# Patient Record
Sex: MALE | Race: BLACK OR AFRICAN AMERICAN | NOT HISPANIC OR LATINO | Employment: OTHER | URBAN - METROPOLITAN AREA
[De-identification: names, ages, dates, MRNs, and addresses within clinical notes are randomized per-mention and may not be internally consistent; named-entity substitution may affect disease eponyms.]

---

## 2023-09-19 PROBLEM — F25.9 SCHIZOAFFECTIVE DISORDER (MULTI): Status: ACTIVE | Noted: 2023-09-19

## 2023-09-19 PROBLEM — I26.09 CHRONIC PULMONARY EMBOLISM WITH ACUTE COR PULMONALE (MULTI): Status: ACTIVE | Noted: 2018-10-06

## 2023-09-19 PROBLEM — N40.1 BENIGN PROSTATIC HYPERPLASIA WITH LOWER URINARY TRACT SYMPTOMS: Status: ACTIVE | Noted: 2021-06-08

## 2023-09-19 PROBLEM — I27.82 CHRONIC PULMONARY EMBOLISM WITH ACUTE COR PULMONALE (MULTI): Status: ACTIVE | Noted: 2018-10-06

## 2023-09-19 PROBLEM — I26.99 PULMONARY EMBOLISM (MULTI): Status: ACTIVE | Noted: 2022-12-20

## 2023-09-19 PROBLEM — R07.9 CHEST PAIN: Status: ACTIVE | Noted: 2020-04-13

## 2023-09-19 PROBLEM — F32.A DEPRESSION: Status: ACTIVE | Noted: 2023-09-19

## 2023-09-19 PROBLEM — I10 HYPERTENSION: Status: ACTIVE | Noted: 2023-09-19

## 2023-09-19 PROBLEM — R45.851 SUICIDAL IDEATION: Status: ACTIVE | Noted: 2023-07-20

## 2023-09-19 PROBLEM — F41.9 ANXIETY: Status: ACTIVE | Noted: 2022-07-28

## 2023-09-19 PROBLEM — E66.9 OBESITY: Status: ACTIVE | Noted: 2022-12-20

## 2023-09-19 PROBLEM — H40.1130 PRIMARY OPEN-ANGLE GLAUCOMA, BILATERAL, STAGE UNSPECIFIED: Status: ACTIVE | Noted: 2021-06-08

## 2023-09-19 PROBLEM — G80.9 CEREBRAL PALSY (MULTI): Status: ACTIVE | Noted: 2023-09-19

## 2023-09-19 RX ORDER — PALIPERIDONE 3 MG/1
3 TABLET, EXTENDED RELEASE ORAL
COMMUNITY
Start: 2023-06-19

## 2023-09-19 RX ORDER — ONDANSETRON 4 MG/1
TABLET, ORALLY DISINTEGRATING ORAL
COMMUNITY
Start: 2023-05-25 | End: 2023-11-13 | Stop reason: WASHOUT

## 2023-09-19 RX ORDER — ISOSORBIDE MONONITRATE 30 MG/1
30 TABLET, EXTENDED RELEASE ORAL
COMMUNITY
Start: 2022-05-18

## 2023-09-19 RX ORDER — PANTOPRAZOLE SODIUM 20 MG/1
TABLET, DELAYED RELEASE ORAL
COMMUNITY
Start: 2022-12-20

## 2023-09-19 RX ORDER — AMLODIPINE BESYLATE 5 MG/1
TABLET ORAL
COMMUNITY
End: 2023-11-13 | Stop reason: WASHOUT

## 2023-09-19 RX ORDER — BENZTROPINE MESYLATE 0.5 MG/1
1 TABLET ORAL NIGHTLY
COMMUNITY
Start: 2023-06-05

## 2023-09-19 RX ORDER — TAMSULOSIN HYDROCHLORIDE 0.4 MG/1
0.4 CAPSULE ORAL DAILY
COMMUNITY
Start: 2015-04-07

## 2023-09-19 RX ORDER — METFORMIN HYDROCHLORIDE 500 MG/1
TABLET, EXTENDED RELEASE ORAL
Status: ON HOLD | COMMUNITY
End: 2023-12-25 | Stop reason: ALTCHOICE

## 2023-09-19 RX ORDER — INDAPAMIDE 1.25 MG/1
1.25 TABLET ORAL
COMMUNITY
Start: 2023-06-14

## 2023-09-19 RX ORDER — CANDESARTAN 16 MG/1
1 TABLET ORAL DAILY
COMMUNITY

## 2023-09-19 RX ORDER — BISACODYL 5 MG/1
1 TABLET, COATED ORAL
COMMUNITY

## 2023-09-19 RX ORDER — ATORVASTATIN CALCIUM 80 MG/1
80 TABLET, FILM COATED ORAL
COMMUNITY
Start: 2023-06-14 | End: 2024-06-13

## 2023-09-19 RX ORDER — TRAZODONE HYDROCHLORIDE 100 MG/1
100 TABLET ORAL
COMMUNITY
Start: 2018-01-30

## 2023-10-29 ENCOUNTER — HOSPITAL ENCOUNTER (EMERGENCY)
Facility: HOSPITAL | Age: 58
Discharge: HOME | End: 2023-10-30
Attending: EMERGENCY MEDICINE
Payer: COMMERCIAL

## 2023-10-29 VITALS
HEART RATE: 75 BPM | TEMPERATURE: 98.5 F | RESPIRATION RATE: 20 BRPM | SYSTOLIC BLOOD PRESSURE: 132 MMHG | DIASTOLIC BLOOD PRESSURE: 94 MMHG | OXYGEN SATURATION: 96 %

## 2023-10-29 DIAGNOSIS — M25.571 CHRONIC PAIN OF RIGHT ANKLE: ICD-10-CM

## 2023-10-29 DIAGNOSIS — R10.84 GENERALIZED ABDOMINAL PAIN: Primary | ICD-10-CM

## 2023-10-29 DIAGNOSIS — G89.29 CHRONIC PAIN OF RIGHT ANKLE: ICD-10-CM

## 2023-10-29 PROCEDURE — 99283 EMERGENCY DEPT VISIT LOW MDM: CPT | Mod: 25 | Performed by: EMERGENCY MEDICINE

## 2023-10-29 PROCEDURE — 99284 EMERGENCY DEPT VISIT MOD MDM: CPT | Performed by: EMERGENCY MEDICINE

## 2023-10-30 ENCOUNTER — CLINICAL SUPPORT (OUTPATIENT)
Dept: EMERGENCY MEDICINE | Facility: HOSPITAL | Age: 58
End: 2023-10-30
Payer: COMMERCIAL

## 2023-10-30 LAB
ATRIAL RATE: 53 BPM
P AXIS: 37 DEGREES
P OFFSET: 207 MS
P ONSET: 154 MS
PR INTERVAL: 148 MS
Q ONSET: 228 MS
QRS COUNT: 8 BEATS
QRS DURATION: 104 MS
QT INTERVAL: 416 MS
QTC CALCULATION(BAZETT): 390 MS
QTC FREDERICIA: 399 MS
R AXIS: -19 DEGREES
T AXIS: -37 DEGREES
T OFFSET: 436 MS
VENTRICULAR RATE: 53 BPM

## 2023-10-30 PROCEDURE — 93005 ELECTROCARDIOGRAM TRACING: CPT

## 2023-10-30 RX ORDER — ACETAMINOPHEN 325 MG/1
650 TABLET ORAL ONCE
Status: COMPLETED | OUTPATIENT
Start: 2023-10-30 | End: 2023-10-30

## 2023-10-30 RX ADMIN — ACETAMINOPHEN 650 MG: 325 TABLET ORAL at 00:23

## 2023-10-30 NOTE — ED PROVIDER NOTES
"HPI   Chief Complaint   Patient presents with    Abdominal Pain     Pt states he feels like he ate some \"bad fish\". Reports rumblings in his stomach. Pt reports nausea but mo vomiting    Generalized Body Aches     Right ankle pain after working out today       Patient is 58-year-old male with past medical history hypertension, DVT/PE on Eliquis, and schizoaffective disorder who presents to the emergency department with generalized abdominal pain.  Patient states he ate fish that he believes was not fully cooked 2 days ago.  Since he has had diffuse crampy abdominal discomfort with associated nausea but no vomiting.  He states that pain is improving, currently rating 3 out of 10 with no nausea at this time. Patient denies diarrhea, black/bloody stools, fever, chills, chest pain, or shortness of breath. Patient notes that he has been constipated over the past several days having small hard bowel movements. Patient also endorses right ankle pain that started 5 days ago after working out.  Patient was seen by his primary care provider who recommended RICE therapy at home.                        No data recorded                Patient History   Past Medical History:   Diagnosis Date    Other conditions influencing health status 07/21/2013    Glaucoma / Intraocular Pressure     Past Surgical History:   Procedure Laterality Date    CT ANGIO HEART CORONARY  10/7/2022    [place] HEART CORONARY [place] [facility] [place] [place]     No family history on file.  Social History     Tobacco Use    Smoking status: Not on file    Smokeless tobacco: Not on file   Substance Use Topics    Alcohol use: Not on file    Drug use: Not on file       Physical Exam   ED Triage Vitals [10/29/23 2350]   Temp Heart Rate Resp BP   36.9 °C (98.5 °F) 75 20 (!) 132/94      SpO2 Temp Source Heart Rate Source Patient Position   96 % Temporal -- --      BP Location FiO2 (%)     -- --       Physical Exam  Constitutional:       General: He is not in " acute distress.     Appearance: He is not ill-appearing.   HENT:      Head: Normocephalic and atraumatic.   Cardiovascular:      Rate and Rhythm: Normal rate and regular rhythm.      Heart sounds: Normal heart sounds. No murmur heard.     Comments: 2+ PT/DP pulses bilaterally.  Pulmonary:      Effort: Pulmonary effort is normal.      Breath sounds: Normal breath sounds. No wheezing, rhonchi or rales.   Abdominal:      General: Bowel sounds are normal. There is no distension.      Palpations: Abdomen is soft. There is no pulsatile mass.      Tenderness: There is no abdominal tenderness.   Musculoskeletal:      Right lower leg: No edema.      Left lower leg: No edema.      Comments:   Mild tenderness to palpation just inferior to the medial malleolus.  full active range of motion.  No pain with plantarflexion, dorsiflexion, inversion or eversion.    Skin:     General: Skin is warm and dry.   Neurological:      General: No focal deficit present.      Mental Status: He is alert and oriented to person, place, and time.       ED Course & MDM   Diagnoses as of 10/30/23 0142   Generalized abdominal pain   Chronic pain of right ankle       Medical Decision Making   58-year-old male presenting with diffuse crampy abdominal pain after eating questionable fish 2 days ago.  Pain is improving with no nausea at this time.  Patient is also concerned about his right ankle pain that he began experiencing after working out.  However, patient was seen by his primary care physician on 10/25 who performed an outpatient x-ray which revealed no acute fractures and recommended RICE therapy at home.     Given patient's history and presentation, I believe gastritis is most likely however other differential diagnoses such as peptic ulcer disease, colitis, urinary tract infection,  ureterolithiasis, and pancreatitis were considered although less likely given that patient's abdominal pain is improving and he denies associated symptoms such as  nausea, vomiting, urinary complaints, or black/bloody stools.     On exam, vital signs stable with a benign abdominal examination. Given that patient's abdominal pain is improving and he has no associated symptoms, there is no benefit from ordering basic lab work or abdominal imaging at this time. Screening EKG ordered to assess for any concerning cardiac pathology. Patient agreeable with this plan.      Patient was recently evaluated outpatient on 10/25 for his right ankle pain.   imaging of the ankle demonstrated no acute fractures and RICE therapy was recommended by primary care provider.  Given that patient's pain is improving and he is able to weight-bear, I am less concerned for an occult fracture and therefore do not think a repeat x-ray is beneficial at this time.    Patient would like Tylenol for right ankle pain at this time, 650 mg p.o. ordered.    EKG interpretation:   Rate 53.  NM interval 148.  QRS duration 104.  QT/QTc 416/390.  Sinus bradycardia.  Moderate voltage criteria for LVH, may be normal variant.  T wave abnormality, consider inferior lateral ischemia.  EKG stable compared with previous on 6/13/23.      Results discussed with patient who indicated understanding.  Patient stable for discharge home at this time.   Patient has a follow-up appointment scheduled for his right ankle pain.                  Luz Marina Davis  10/30/23 0156

## 2023-10-30 NOTE — ED PROVIDER NOTES
"HPI   Chief Complaint   Patient presents with    Abdominal Pain     Pt states he feels like he ate some \"bad fish\". Reports rumblings in his stomach. Pt reports nausea but mo vomiting    Generalized Body Aches     Right ankle pain after working out today       HPI                    No data recorded                Patient History   Past Medical History:   Diagnosis Date    Other conditions influencing health status 07/21/2013    Glaucoma / Intraocular Pressure     Past Surgical History:   Procedure Laterality Date    CT ANGIO HEART CORONARY  10/7/2022    [place]ART [place]ONARY [place] [facility] [place]     No family history on file.  Social History     Tobacco Use    Smoking status: Not on file    Smokeless tobacco: Not on file   Substance Use Topics    Alcohol use: Not on file    Drug use: Not on file       Physical Exam   ED Triage Vitals [10/29/23 2350]   Temp Heart Rate Resp BP   36.9 °C (98.5 °F) 75 20 (!) 132/94      SpO2 Temp Source Heart Rate Source Patient Position   96 % Temporal -- --      BP Location FiO2 (%)     -- --       Physical Exam    ED Course & MDM        Medical Decision Making      Procedure  Procedures  "

## 2023-11-12 ENCOUNTER — HOSPITAL ENCOUNTER (EMERGENCY)
Facility: HOSPITAL | Age: 58
Discharge: LEFT BEFORE DISCHARGE | End: 2023-11-12
Payer: COMMERCIAL

## 2023-11-12 VITALS
DIASTOLIC BLOOD PRESSURE: 76 MMHG | TEMPERATURE: 97.3 F | OXYGEN SATURATION: 97 % | SYSTOLIC BLOOD PRESSURE: 127 MMHG | RESPIRATION RATE: 20 BRPM | HEART RATE: 73 BPM

## 2023-11-12 PROCEDURE — 99281 EMR DPT VST MAYX REQ PHY/QHP: CPT

## 2023-11-12 PROCEDURE — 4500999001 HC ED NO CHARGE

## 2023-11-12 ASSESSMENT — COLUMBIA-SUICIDE SEVERITY RATING SCALE - C-SSRS
2. HAVE YOU ACTUALLY HAD ANY THOUGHTS OF KILLING YOURSELF?: NO
1. IN THE PAST MONTH, HAVE YOU WISHED YOU WERE DEAD OR WISHED YOU COULD GO TO SLEEP AND NOT WAKE UP?: NO
6. HAVE YOU EVER DONE ANYTHING, STARTED TO DO ANYTHING, OR PREPARED TO DO ANYTHING TO END YOUR LIFE?: NO

## 2023-11-13 ENCOUNTER — TELEMEDICINE (OUTPATIENT)
Dept: PRIMARY CARE | Facility: CLINIC | Age: 58
End: 2023-11-13
Payer: COMMERCIAL

## 2023-11-13 DIAGNOSIS — G89.29 CHRONIC PAIN OF RIGHT ANKLE: Primary | ICD-10-CM

## 2023-11-13 DIAGNOSIS — M25.571 CHRONIC PAIN OF RIGHT ANKLE: Primary | ICD-10-CM

## 2023-11-13 DIAGNOSIS — G80.9 CEREBRAL PALSY, UNSPECIFIED TYPE (MULTI): ICD-10-CM

## 2023-11-13 PROCEDURE — 99213 OFFICE O/P EST LOW 20 MIN: CPT | Performed by: PHYSICIAN ASSISTANT

## 2023-11-13 NOTE — PROGRESS NOTES
An interactive audio and video telecommunication system which permits real time communications between the patient (at the originating site) and provider (at the distant site) was utilized to provide this telehealth service.   Verbal consent was requested and obtained from Eleazar Burrell on this date, 11/13/23 for a telehealth visit.     Subjective    Eleazar Burrell is a 58 y.o. year old male patient presenting for virtual visit R ankle pain.   R knee and ankle pain. OA R knee physical therapy has been in physical therapy and is going to therapy for R shoulder now. R ankle pain has been ongoing. No improvement with PT, inserts and lidocaine patches. Requests physical therapy for ankle. Feels weak and pain, helps with knee, may help with ankle.    Patient Active Problem List   Diagnosis    Suicidal ideation    Schizoaffective disorder (CMS/HCC)    Primary open-angle glaucoma, bilateral, stage unspecified    Hypertension    Personal history of DVT (deep vein thrombosis)    Obesity    History of pulmonary embolism    Depression    Pulmonary embolism (CMS/HCC)    Chronic pulmonary embolism with acute cor pulmonale (CMS/HCC)    Chest pain    Cerebral palsy (CMS/HCC)    Benign prostatic hyperplasia with lower urinary tract symptoms    Anxiety       Past Medical History:   Diagnosis Date    Other conditions influencing health status 07/21/2013    Glaucoma / Intraocular Pressure      Past Surgical History:   Procedure Laterality Date    CT ANGIO CORONARY ART WITH HEARTFLOW IF SCORE >30%  10/7/2022    CT HEART CORONARY ANGIOGRAM 10/7/2022 [facility] [place] [facility]      No family history on file.   Social History     Tobacco Use    Smoking status: Not on file    Smokeless tobacco: Not on file   Substance Use Topics    Alcohol use: Not on file        Current Outpatient Medications:     apixaban (Eliquis) 5 mg tablet, Take 1 tablet (5 mg) by mouth twice a day., Disp: , Rfl:     ARIPiprazole lauroxil (Aristada) 882 mg/3.2 mL  injection, Inject 3.2 mL (882 mg) into the shoulder, thigh, or buttocks., Disp: , Rfl:     atorvastatin (Lipitor) 80 mg tablet, Take 1 tablet (80 mg) by mouth once daily., Disp: , Rfl:     benztropine (Cogentin) 0.5 mg tablet, Take 1 tablet (0.5 mg) by mouth once daily at bedtime., Disp: , Rfl:     candesartan (Atacand) 16 mg tablet, Take 1 tablet (16 mg) by mouth once daily., Disp: , Rfl:     indapamide (Lozol) 1.25 mg tablet, Take 1 tablet (1.25 mg) by mouth once daily., Disp: , Rfl:     isosorbide mononitrate ER (Imdur) 30 mg 24 hr tablet, Take 1 tablet (30 mg) by mouth once daily., Disp: , Rfl:     metFORMIN  mg 24 hr tablet, Take by mouth., Disp: , Rfl:     multivitamin with minerals iron-free (Multivitamin 50 Plus), Take 1 tablet by mouth once daily., Disp: , Rfl:     paliperidone (Invega) 3 mg 24 hr tablet, Take 1 tablet (3 mg) by mouth once daily., Disp: , Rfl:     pantoprazole (ProtoNix) 20 mg EC tablet, Take by mouth once daily., Disp: , Rfl:     tamsulosin (Flomax) 0.4 mg 24 hr capsule, Take 1 capsule (0.4 mg) by mouth once daily., Disp: , Rfl:     traZODone (Desyrel) 100 mg tablet, Take 1 tablet (100 mg) by mouth., Disp: , Rfl:      Review of Systems    Review of Systems:   Constitutional: Denies fever  HEENT: Denies ST, earache  CVS: Denies Chest pain  Pulmonary: Denies wheezing, SOB  GI: Denies N/V  : Denies dysuria  Musculoskeletal:  Denies myalgia  Neuro: Denies focal weakness or numbness.  Skin: Denies Rashes.  *Review of Systems is negative unless otherwise mentioned in HPI or ROS above.     Objective    VITALS  Pt does not have vitals available at time of visit.    Exam       Limited physical exam in virtual platform  Nontoxic. No acute distress.  Nonlabored breathing.    Assessment/Plan      Problem List Items Addressed This Visit       Cerebral palsy (CMS/HCC)    Relevant Orders    Referral to Physical Therapy    Referral to Primary Care     Other Visit Diagnoses       Chronic pain of  right ankle    -  Primary    Relevant Orders    Referral to Physical Therapy               Referral placed to primary care.      DISCHARGE      Any prescriptions were sent to the pharmacy, please make sure to pick them up and call if there are any issues or questions.     Thank you for trusting me to be a part of your healthcare.    Take care!     Katarzyna Burns PA-C  [facility]  [phone] [phone]

## 2023-11-20 ENCOUNTER — APPOINTMENT (OUTPATIENT)
Dept: ORTHOPEDIC SURGERY | Facility: HOSPITAL | Age: 58
End: 2023-11-20
Payer: MEDICAID

## 2023-11-21 ENCOUNTER — APPOINTMENT (OUTPATIENT)
Dept: PRIMARY CARE | Facility: CLINIC | Age: 58
End: 2023-11-21
Payer: COMMERCIAL

## 2023-11-21 ENCOUNTER — HOSPITAL ENCOUNTER (OUTPATIENT)
Dept: RADIOLOGY | Facility: EXTERNAL LOCATION | Age: 58
Discharge: HOME | End: 2023-11-21

## 2023-12-24 ENCOUNTER — HOSPITAL ENCOUNTER (OUTPATIENT)
Facility: HOSPITAL | Age: 58
Setting detail: OBSERVATION
LOS: 1 days | Discharge: HOME | DRG: 880 | End: 2023-12-25
Attending: EMERGENCY MEDICINE | Admitting: INTERNAL MEDICINE
Payer: COMMERCIAL

## 2023-12-24 DIAGNOSIS — F43.9 CHEST PAIN DUE TO PSYCHOLOGICAL STRESS: Primary | ICD-10-CM

## 2023-12-24 DIAGNOSIS — I10 HYPERTENSION, UNSPECIFIED TYPE: ICD-10-CM

## 2023-12-24 DIAGNOSIS — R07.9 CHEST PAIN DUE TO PSYCHOLOGICAL STRESS: Primary | ICD-10-CM

## 2023-12-24 PROCEDURE — 93010 ELECTROCARDIOGRAM REPORT: CPT | Performed by: EMERGENCY MEDICINE

## 2023-12-24 PROCEDURE — 99285 EMERGENCY DEPT VISIT HI MDM: CPT | Performed by: EMERGENCY MEDICINE

## 2023-12-24 ASSESSMENT — LIFESTYLE VARIABLES
EVER HAD A DRINK FIRST THING IN THE MORNING TO STEADY YOUR NERVES TO GET RID OF A HANGOVER: NO
HAVE PEOPLE ANNOYED YOU BY CRITICIZING YOUR DRINKING: NO
REASON UNABLE TO ASSESS: NO
EVER FELT BAD OR GUILTY ABOUT YOUR DRINKING: NO
HAVE YOU EVER FELT YOU SHOULD CUT DOWN ON YOUR DRINKING: NO

## 2023-12-24 ASSESSMENT — PAIN DESCRIPTION - LOCATION: LOCATION: CHEST

## 2023-12-24 ASSESSMENT — PAIN SCALES - GENERAL
PAINLEVEL_OUTOF10: 4
PAINLEVEL_OUTOF10: 4

## 2023-12-24 ASSESSMENT — PAIN DESCRIPTION - FREQUENCY: FREQUENCY: INTERMITTENT

## 2023-12-24 ASSESSMENT — PAIN DESCRIPTION - PAIN TYPE: TYPE: ACUTE PAIN

## 2023-12-24 ASSESSMENT — PAIN DESCRIPTION - DESCRIPTORS: DESCRIPTORS: ACHING

## 2023-12-24 ASSESSMENT — COLUMBIA-SUICIDE SEVERITY RATING SCALE - C-SSRS
6. HAVE YOU EVER DONE ANYTHING, STARTED TO DO ANYTHING, OR PREPARED TO DO ANYTHING TO END YOUR LIFE?: NO
2. HAVE YOU ACTUALLY HAD ANY THOUGHTS OF KILLING YOURSELF?: NO
1. IN THE PAST MONTH, HAVE YOU WISHED YOU WERE DEAD OR WISHED YOU COULD GO TO SLEEP AND NOT WAKE UP?: NO

## 2023-12-24 ASSESSMENT — PAIN DESCRIPTION - ORIENTATION: ORIENTATION: MID

## 2023-12-24 ASSESSMENT — PAIN - FUNCTIONAL ASSESSMENT: PAIN_FUNCTIONAL_ASSESSMENT: 0-10

## 2023-12-25 ENCOUNTER — APPOINTMENT (OUTPATIENT)
Dept: RADIOLOGY | Facility: HOSPITAL | Age: 58
DRG: 880 | End: 2023-12-25
Payer: COMMERCIAL

## 2023-12-25 VITALS
RESPIRATION RATE: 18 BRPM | WEIGHT: 192 LBS | DIASTOLIC BLOOD PRESSURE: 85 MMHG | HEART RATE: 55 BPM | SYSTOLIC BLOOD PRESSURE: 137 MMHG | BODY MASS INDEX: 29.1 KG/M2 | OXYGEN SATURATION: 99 % | HEIGHT: 68 IN | TEMPERATURE: 97.5 F

## 2023-12-25 PROBLEM — F43.9 CHEST PAIN DUE TO PSYCHOLOGICAL STRESS: Status: ACTIVE | Noted: 2023-12-25

## 2023-12-25 PROBLEM — R07.9 CHEST PAIN DUE TO PSYCHOLOGICAL STRESS: Status: ACTIVE | Noted: 2023-12-25

## 2023-12-25 LAB
ALBUMIN SERPL BCP-MCNC: 3.6 G/DL (ref 3.4–5)
ALP SERPL-CCNC: 58 U/L (ref 33–136)
ALT SERPL W P-5'-P-CCNC: 12 U/L (ref 10–52)
ANION GAP SERPL CALC-SCNC: 11 MMOL/L (ref 10–20)
AST SERPL W P-5'-P-CCNC: 19 U/L (ref 9–39)
ATRIAL RATE: 51 BPM
BASOPHILS # BLD AUTO: 0.02 X10*3/UL (ref 0–0.1)
BASOPHILS NFR BLD AUTO: 0.3 %
BILIRUB SERPL-MCNC: 0.5 MG/DL (ref 0–1.2)
BUN SERPL-MCNC: 23 MG/DL (ref 6–23)
CALCIUM SERPL-MCNC: 9.5 MG/DL (ref 8.6–10.6)
CARDIAC TROPONIN I PNL SERPL HS: 76 NG/L (ref 0–53)
CARDIAC TROPONIN I PNL SERPL HS: 80 NG/L (ref 0–53)
CARDIAC TROPONIN I PNL SERPL HS: 83 NG/L (ref 0–53)
CHLORIDE SERPL-SCNC: 105 MMOL/L (ref 98–107)
CO2 SERPL-SCNC: 28 MMOL/L (ref 21–32)
CREAT SERPL-MCNC: 1.08 MG/DL (ref 0.5–1.3)
EOSINOPHIL # BLD AUTO: 0.08 X10*3/UL (ref 0–0.7)
EOSINOPHIL NFR BLD AUTO: 1.3 %
ERYTHROCYTE [DISTWIDTH] IN BLOOD BY AUTOMATED COUNT: 12.9 % (ref 11.5–14.5)
GFR SERPL CREATININE-BSD FRML MDRD: 79 ML/MIN/1.73M*2
GLUCOSE SERPL-MCNC: 98 MG/DL (ref 74–99)
HCT VFR BLD AUTO: 38.7 % (ref 41–52)
HGB BLD-MCNC: 13.5 G/DL (ref 13.5–17.5)
IMM GRANULOCYTES # BLD AUTO: 0.01 X10*3/UL (ref 0–0.7)
IMM GRANULOCYTES NFR BLD AUTO: 0.2 % (ref 0–0.9)
LYMPHOCYTES # BLD AUTO: 3.2 X10*3/UL (ref 1.2–4.8)
LYMPHOCYTES NFR BLD AUTO: 51.6 %
MAGNESIUM SERPL-MCNC: 1.92 MG/DL (ref 1.6–2.4)
MCH RBC QN AUTO: 30.1 PG (ref 26–34)
MCHC RBC AUTO-ENTMCNC: 34.9 G/DL (ref 32–36)
MCV RBC AUTO: 86 FL (ref 80–100)
MONOCYTES # BLD AUTO: 0.65 X10*3/UL (ref 0.1–1)
MONOCYTES NFR BLD AUTO: 10.5 %
NEUTROPHILS # BLD AUTO: 2.24 X10*3/UL (ref 1.2–7.7)
NEUTROPHILS NFR BLD AUTO: 36.1 %
NRBC BLD-RTO: 0 /100 WBCS (ref 0–0)
P AXIS: 31 DEGREES
P OFFSET: 184 MS
P ONSET: 133 MS
PLATELET # BLD AUTO: 183 X10*3/UL (ref 150–450)
POTASSIUM SERPL-SCNC: 3.4 MMOL/L (ref 3.5–5.3)
PR INTERVAL: 156 MS
PROT SERPL-MCNC: 6.5 G/DL (ref 6.4–8.2)
Q ONSET: 211 MS
QRS COUNT: 8 BEATS
QRS DURATION: 110 MS
QT INTERVAL: 416 MS
QTC CALCULATION(BAZETT): 383 MS
QTC FREDERICIA: 394 MS
R AXIS: -25 DEGREES
RBC # BLD AUTO: 4.48 X10*6/UL (ref 4.5–5.9)
SODIUM SERPL-SCNC: 141 MMOL/L (ref 136–145)
T AXIS: -29 DEGREES
T OFFSET: 419 MS
VENTRICULAR RATE: 51 BPM
WBC # BLD AUTO: 6.2 X10*3/UL (ref 4.4–11.3)

## 2023-12-25 PROCEDURE — 2500000001 HC RX 250 WO HCPCS SELF ADMINISTERED DRUGS (ALT 637 FOR MEDICARE OP)

## 2023-12-25 PROCEDURE — 99239 HOSP IP/OBS DSCHRG MGMT >30: CPT

## 2023-12-25 PROCEDURE — 36415 COLL VENOUS BLD VENIPUNCTURE: CPT

## 2023-12-25 PROCEDURE — 84484 ASSAY OF TROPONIN QUANT: CPT

## 2023-12-25 PROCEDURE — 71045 X-RAY EXAM CHEST 1 VIEW: CPT

## 2023-12-25 PROCEDURE — 71045 X-RAY EXAM CHEST 1 VIEW: CPT | Performed by: RADIOLOGY

## 2023-12-25 PROCEDURE — 85025 COMPLETE CBC W/AUTO DIFF WBC: CPT

## 2023-12-25 PROCEDURE — 1100000001 HC PRIVATE ROOM DAILY

## 2023-12-25 PROCEDURE — 80053 COMPREHEN METABOLIC PANEL: CPT

## 2023-12-25 PROCEDURE — 36415 COLL VENOUS BLD VENIPUNCTURE: CPT | Mod: 59

## 2023-12-25 PROCEDURE — G0378 HOSPITAL OBSERVATION PER HR: HCPCS

## 2023-12-25 PROCEDURE — 2500000004 HC RX 250 GENERAL PHARMACY W/ HCPCS (ALT 636 FOR OP/ED)

## 2023-12-25 PROCEDURE — 83735 ASSAY OF MAGNESIUM: CPT

## 2023-12-25 RX ORDER — POLYETHYLENE GLYCOL 3350 17 G/17G
17 POWDER, FOR SOLUTION ORAL ONCE AS NEEDED
COMMUNITY

## 2023-12-25 RX ORDER — TAMSULOSIN HYDROCHLORIDE 0.4 MG/1
0.4 CAPSULE ORAL DAILY
Status: DISCONTINUED | OUTPATIENT
Start: 2023-12-25 | End: 2023-12-25 | Stop reason: HOSPADM

## 2023-12-25 RX ORDER — HYDROXYZINE HYDROCHLORIDE 10 MG/1
10 TABLET, FILM COATED ORAL NIGHTLY PRN
Status: DISCONTINUED | OUTPATIENT
Start: 2023-12-25 | End: 2023-12-25 | Stop reason: HOSPADM

## 2023-12-25 RX ORDER — CANDESARTAN 16 MG/1
16 TABLET ORAL DAILY
Qty: 30 TABLET | Refills: 1 | Status: SHIPPED | OUTPATIENT
Start: 2023-12-25 | End: 2024-02-23

## 2023-12-25 RX ORDER — ACETAMINOPHEN 500 MG
1000 TABLET ORAL 2 TIMES DAILY PRN
COMMUNITY

## 2023-12-25 RX ORDER — POTASSIUM CHLORIDE 1.5 G/1.58G
20 POWDER, FOR SOLUTION ORAL 2 TIMES DAILY
Status: DISCONTINUED | OUTPATIENT
Start: 2023-12-25 | End: 2023-12-25 | Stop reason: HOSPADM

## 2023-12-25 RX ORDER — DIVALPROEX SODIUM 500 MG/1
1000 TABLET, FILM COATED, EXTENDED RELEASE ORAL NIGHTLY
COMMUNITY

## 2023-12-25 RX ORDER — RISPERIDONE 0.5 MG/1
0.5 TABLET ORAL 2 TIMES DAILY
Status: DISCONTINUED | OUTPATIENT
Start: 2023-12-25 | End: 2023-12-25 | Stop reason: HOSPADM

## 2023-12-25 RX ORDER — ISOSORBIDE MONONITRATE 30 MG/1
30 TABLET, EXTENDED RELEASE ORAL DAILY
Status: DISCONTINUED | OUTPATIENT
Start: 2023-12-25 | End: 2023-12-25 | Stop reason: HOSPADM

## 2023-12-25 RX ORDER — NITROGLYCERIN 0.4 MG/1
0.4 TABLET SUBLINGUAL EVERY 5 MIN PRN
Status: DISCONTINUED | OUTPATIENT
Start: 2023-12-25 | End: 2023-12-25

## 2023-12-25 RX ORDER — DICLOFENAC SODIUM 10 MG/G
4 GEL TOPICAL ONCE AS NEEDED
COMMUNITY

## 2023-12-25 RX ORDER — ATORVASTATIN CALCIUM 80 MG/1
80 TABLET, FILM COATED ORAL
Status: DISCONTINUED | OUTPATIENT
Start: 2023-12-25 | End: 2023-12-25 | Stop reason: HOSPADM

## 2023-12-25 RX ORDER — TRAZODONE HYDROCHLORIDE 50 MG/1
100 TABLET ORAL NIGHTLY
Status: DISCONTINUED | OUTPATIENT
Start: 2023-12-25 | End: 2023-12-25 | Stop reason: HOSPADM

## 2023-12-25 RX ORDER — PALIPERIDONE 3 MG/1
3 TABLET, EXTENDED RELEASE ORAL
Status: DISCONTINUED | OUTPATIENT
Start: 2023-12-25 | End: 2023-12-25

## 2023-12-25 RX ORDER — POLYETHYLENE GLYCOL 3350 17 G/17G
17 POWDER, FOR SOLUTION ORAL DAILY
Status: DISCONTINUED | OUTPATIENT
Start: 2023-12-25 | End: 2023-12-25 | Stop reason: HOSPADM

## 2023-12-25 RX ORDER — TALC
3 POWDER (GRAM) TOPICAL NIGHTLY PRN
COMMUNITY

## 2023-12-25 RX ORDER — PANTOPRAZOLE SODIUM 40 MG/1
40 TABLET, DELAYED RELEASE ORAL
Status: DISCONTINUED | OUTPATIENT
Start: 2023-12-25 | End: 2023-12-25 | Stop reason: HOSPADM

## 2023-12-25 RX ORDER — BENZTROPINE MESYLATE 0.5 MG/1
0.5 TABLET ORAL NIGHTLY
Status: DISCONTINUED | OUTPATIENT
Start: 2023-12-25 | End: 2023-12-25 | Stop reason: HOSPADM

## 2023-12-25 RX ORDER — AMLODIPINE BESYLATE 5 MG/1
5 TABLET ORAL DAILY
COMMUNITY

## 2023-12-25 RX ORDER — NITROGLYCERIN 0.4 MG/1
0.4 TABLET SUBLINGUAL EVERY 5 MIN PRN
COMMUNITY

## 2023-12-25 RX ADMIN — ISOSORBIDE MONONITRATE 30 MG: 30 TABLET, EXTENDED RELEASE ORAL at 11:55

## 2023-12-25 RX ADMIN — RISPERIDONE 0.5 MG: 0.5 TABLET ORAL at 11:55

## 2023-12-25 RX ADMIN — TAMSULOSIN HYDROCHLORIDE 0.4 MG: 0.4 CAPSULE ORAL at 10:15

## 2023-12-25 RX ADMIN — ATORVASTATIN CALCIUM 80 MG: 20 TABLET, FILM COATED ORAL at 07:16

## 2023-12-25 RX ADMIN — APIXABAN 5 MG: 5 TABLET, FILM COATED ORAL at 10:15

## 2023-12-25 RX ADMIN — POTASSIUM CHLORIDE 20 MEQ: 1.5 POWDER, FOR SOLUTION ORAL at 10:15

## 2023-12-25 RX ADMIN — PANTOPRAZOLE SODIUM 40 MG: 40 TABLET, DELAYED RELEASE ORAL at 07:16

## 2023-12-25 SDOH — SOCIAL STABILITY: SOCIAL NETWORK: HOW OFTEN DO YOU ATTEND CHURCH OR RELIGIOUS SERVICES?: MORE THAN 4 TIMES PER YEAR

## 2023-12-25 SDOH — HEALTH STABILITY: MENTAL HEALTH: HOW OFTEN DO YOU HAVE A DRINK CONTAINING ALCOHOL?: NEVER

## 2023-12-25 SDOH — HEALTH STABILITY: PHYSICAL HEALTH: ON AVERAGE, HOW MANY MINUTES DO YOU ENGAGE IN EXERCISE AT THIS LEVEL?: PATIENT DECLINED

## 2023-12-25 SDOH — SOCIAL STABILITY: SOCIAL NETWORK
DO YOU BELONG TO ANY CLUBS OR ORGANIZATIONS SUCH AS CHURCH GROUPS UNIONS, FRATERNAL OR ATHLETIC GROUPS, OR SCHOOL GROUPS?: YES

## 2023-12-25 SDOH — SOCIAL STABILITY: SOCIAL NETWORK: HOW OFTEN DO YOU GET TOGETHER WITH FRIENDS OR RELATIVES?: PATIENT DECLINED

## 2023-12-25 SDOH — ECONOMIC STABILITY: INCOME INSECURITY: HOW HARD IS IT FOR YOU TO PAY FOR THE VERY BASICS LIKE FOOD, HOUSING, MEDICAL CARE, AND HEATING?: NOT VERY HARD

## 2023-12-25 SDOH — SOCIAL STABILITY: SOCIAL INSECURITY: HAS ANYONE EVER THREATENED TO HURT YOUR FAMILY OR YOUR PETS?: NO

## 2023-12-25 SDOH — SOCIAL STABILITY: SOCIAL NETWORK: HOW OFTEN DO YOU ATTENT MEETINGS OF THE CLUB OR ORGANIZATION YOU BELONG TO?: MORE THAN 4 TIMES PER YEAR

## 2023-12-25 SDOH — ECONOMIC STABILITY: FOOD INSECURITY: WITHIN THE PAST 12 MONTHS, YOU WORRIED THAT YOUR FOOD WOULD RUN OUT BEFORE YOU GOT MONEY TO BUY MORE.: NEVER TRUE

## 2023-12-25 SDOH — HEALTH STABILITY: MENTAL HEALTH
STRESS IS WHEN SOMEONE FEELS TENSE, NERVOUS, ANXIOUS, OR CAN'T SLEEP AT NIGHT BECAUSE THEIR MIND IS TROUBLED. HOW STRESSED ARE YOU?: VERY MUCH

## 2023-12-25 SDOH — ECONOMIC STABILITY: INCOME INSECURITY: IN THE LAST 12 MONTHS, WAS THERE A TIME WHEN YOU WERE NOT ABLE TO PAY THE MORTGAGE OR RENT ON TIME?: NO

## 2023-12-25 SDOH — SOCIAL STABILITY: SOCIAL INSECURITY: WITHIN THE LAST YEAR, HAVE YOU BEEN HUMILIATED OR EMOTIONALLY ABUSED IN OTHER WAYS BY YOUR PARTNER OR EX-PARTNER?: NO

## 2023-12-25 SDOH — SOCIAL STABILITY: SOCIAL NETWORK: IN A TYPICAL WEEK, HOW MANY TIMES DO YOU TALK ON THE PHONE WITH FAMILY, FRIENDS, OR NEIGHBORS?: THREE TIMES A WEEK

## 2023-12-25 SDOH — ECONOMIC STABILITY: HOUSING INSECURITY
IN THE LAST 12 MONTHS, WAS THERE A TIME WHEN YOU DID NOT HAVE A STEADY PLACE TO SLEEP OR SLEPT IN A SHELTER (INCLUDING NOW)?: NO

## 2023-12-25 SDOH — SOCIAL STABILITY: SOCIAL INSECURITY
WITHIN THE LAST YEAR, HAVE YOU BEEN KICKED, HIT, SLAPPED, OR OTHERWISE PHYSICALLY HURT BY YOUR PARTNER OR EX-PARTNER?: NO

## 2023-12-25 SDOH — ECONOMIC STABILITY: TRANSPORTATION INSECURITY
IN THE PAST 12 MONTHS, HAS LACK OF TRANSPORTATION KEPT YOU FROM MEETINGS, WORK, OR FROM GETTING THINGS NEEDED FOR DAILY LIVING?: NO

## 2023-12-25 SDOH — SOCIAL STABILITY: SOCIAL NETWORK: ARE YOU MARRIED, WIDOWED, DIVORCED, SEPARATED, NEVER MARRIED, OR LIVING WITH A PARTNER?: PATIENT DECLINED

## 2023-12-25 SDOH — SOCIAL STABILITY: SOCIAL INSECURITY: WITHIN THE LAST YEAR, HAVE YOU BEEN AFRAID OF YOUR PARTNER OR EX-PARTNER?: NO

## 2023-12-25 SDOH — SOCIAL STABILITY: SOCIAL INSECURITY: ARE YOU OR HAVE YOU BEEN THREATENED OR ABUSED PHYSICALLY, EMOTIONALLY, OR SEXUALLY BY ANYONE?: NO

## 2023-12-25 SDOH — SOCIAL STABILITY: SOCIAL INSECURITY
WITHIN THE LAST YEAR, HAVE TO BEEN RAPED OR FORCED TO HAVE ANY KIND OF SEXUAL ACTIVITY BY YOUR PARTNER OR EX-PARTNER?: NO

## 2023-12-25 SDOH — SOCIAL STABILITY: SOCIAL INSECURITY: ABUSE: ADULT

## 2023-12-25 SDOH — ECONOMIC STABILITY: HOUSING INSECURITY: IN THE LAST 12 MONTHS, HOW MANY PLACES HAVE YOU LIVED?: 1

## 2023-12-25 SDOH — HEALTH STABILITY: MENTAL HEALTH: HOW MANY STANDARD DRINKS CONTAINING ALCOHOL DO YOU HAVE ON A TYPICAL DAY?: PATIENT DOES NOT DRINK

## 2023-12-25 SDOH — ECONOMIC STABILITY: INCOME INSECURITY: IN THE PAST 12 MONTHS, HAS THE ELECTRIC, GAS, OIL, OR WATER COMPANY THREATENED TO SHUT OFF SERVICE IN YOUR HOME?: NO

## 2023-12-25 SDOH — SOCIAL STABILITY: SOCIAL INSECURITY: DOES ANYONE TRY TO KEEP YOU FROM HAVING/CONTACTING OTHER FRIENDS OR DOING THINGS OUTSIDE YOUR HOME?: NO

## 2023-12-25 SDOH — ECONOMIC STABILITY: FOOD INSECURITY: WITHIN THE PAST 12 MONTHS, THE FOOD YOU BOUGHT JUST DIDN'T LAST AND YOU DIDN'T HAVE MONEY TO GET MORE.: NEVER TRUE

## 2023-12-25 SDOH — ECONOMIC STABILITY: TRANSPORTATION INSECURITY
IN THE PAST 12 MONTHS, HAS THE LACK OF TRANSPORTATION KEPT YOU FROM MEDICAL APPOINTMENTS OR FROM GETTING MEDICATIONS?: NO

## 2023-12-25 SDOH — HEALTH STABILITY: MENTAL HEALTH: HOW OFTEN DO YOU HAVE 6 OR MORE DRINKS ON ONE OCCASION?: NEVER

## 2023-12-25 SDOH — HEALTH STABILITY: PHYSICAL HEALTH
ON AVERAGE, HOW MANY DAYS PER WEEK DO YOU ENGAGE IN MODERATE TO STRENUOUS EXERCISE (LIKE A BRISK WALK)?: PATIENT DECLINED

## 2023-12-25 SDOH — SOCIAL STABILITY: SOCIAL INSECURITY: WERE YOU ABLE TO COMPLETE ALL THE BEHAVIORAL HEALTH SCREENINGS?: YES

## 2023-12-25 SDOH — SOCIAL STABILITY: SOCIAL INSECURITY: DO YOU FEEL UNSAFE GOING BACK TO THE PLACE WHERE YOU ARE LIVING?: NO

## 2023-12-25 SDOH — SOCIAL STABILITY: SOCIAL INSECURITY: HAVE YOU HAD THOUGHTS OF HARMING ANYONE ELSE?: NO

## 2023-12-25 SDOH — SOCIAL STABILITY: SOCIAL INSECURITY: DO YOU FEEL ANYONE HAS EXPLOITED OR TAKEN ADVANTAGE OF YOU FINANCIALLY OR OF YOUR PERSONAL PROPERTY?: NO

## 2023-12-25 SDOH — SOCIAL STABILITY: SOCIAL INSECURITY: ARE THERE ANY APPARENT SIGNS OF INJURIES/BEHAVIORS THAT COULD BE RELATED TO ABUSE/NEGLECT?: NO

## 2023-12-25 ASSESSMENT — PAIN DESCRIPTION - ORIENTATION: ORIENTATION: MID

## 2023-12-25 ASSESSMENT — PATIENT HEALTH QUESTIONNAIRE - PHQ9
1. LITTLE INTEREST OR PLEASURE IN DOING THINGS: MORE THAN HALF THE DAYS
2. FEELING DOWN, DEPRESSED OR HOPELESS: NEARLY EVERY DAY
SUM OF ALL RESPONSES TO PHQ9 QUESTIONS 1 & 2: 5

## 2023-12-25 ASSESSMENT — PAIN DESCRIPTION - LOCATION: LOCATION: CHEST

## 2023-12-25 ASSESSMENT — LIFESTYLE VARIABLES
AUDIT-C TOTAL SCORE: 0
HOW MANY STANDARD DRINKS CONTAINING ALCOHOL DO YOU HAVE ON A TYPICAL DAY: PATIENT DOES NOT DRINK
PRESCIPTION_ABUSE_PAST_12_MONTHS: NO
SKIP TO QUESTIONS 9-10: 1
AUDIT-C TOTAL SCORE: 0
SUBSTANCE_ABUSE_PAST_12_MONTHS: NO
AUDIT-C TOTAL SCORE: 0
AUDIT-C TOTAL SCORE: 0
HOW OFTEN DO YOU HAVE 6 OR MORE DRINKS ON ONE OCCASION: NEVER
AUDIT-C TOTAL SCORE: 0
SKIP TO QUESTIONS 9-10: 1
HOW OFTEN DO YOU HAVE A DRINK CONTAINING ALCOHOL: NEVER
SKIP TO QUESTIONS 9-10: 1
SKIP TO QUESTIONS 9-10: 1

## 2023-12-25 ASSESSMENT — ACTIVITIES OF DAILY LIVING (ADL)
TOILETING: INDEPENDENT
HEARING - RIGHT EAR: FUNCTIONAL
JUDGMENT_ADEQUATE_SAFELY_COMPLETE_DAILY_ACTIVITIES: YES
ASSISTIVE_DEVICE: NONE;OTHER (COMMENT)
DRESSING YOURSELF: INDEPENDENT
GROOMING: INDEPENDENT
ADEQUATE_TO_COMPLETE_ADL: YES
PATIENT'S MEMORY ADEQUATE TO SAFELY COMPLETE DAILY ACTIVITIES?: YES
LACK_OF_TRANSPORTATION: NO
BATHING: INDEPENDENT
WALKS IN HOME: INDEPENDENT
FEEDING YOURSELF: INDEPENDENT
HEARING - LEFT EAR: FUNCTIONAL

## 2023-12-25 ASSESSMENT — COGNITIVE AND FUNCTIONAL STATUS - GENERAL
PATIENT BASELINE BEDBOUND: NO
DAILY ACTIVITIY SCORE: 24
MOBILITY SCORE: 24

## 2023-12-25 ASSESSMENT — PAIN DESCRIPTION - PROGRESSION: CLINICAL_PROGRESSION: GRADUALLY IMPROVING

## 2023-12-25 ASSESSMENT — PAIN DESCRIPTION - DESCRIPTORS: DESCRIPTORS: ACHING

## 2023-12-25 ASSESSMENT — PAIN DESCRIPTION - FREQUENCY: FREQUENCY: CONSTANT/CONTINUOUS

## 2023-12-25 ASSESSMENT — PAIN SCALES - GENERAL
PAINLEVEL_OUTOF10: 2
PAINLEVEL_OUTOF10: 0 - NO PAIN

## 2023-12-25 ASSESSMENT — PAIN DESCRIPTION - ONSET: ONSET: ONGOING

## 2023-12-25 ASSESSMENT — PAIN - FUNCTIONAL ASSESSMENT: PAIN_FUNCTIONAL_ASSESSMENT: 0-10

## 2023-12-25 ASSESSMENT — PAIN DESCRIPTION - PAIN TYPE: TYPE: ACUTE PAIN

## 2023-12-25 NOTE — ED TRIAGE NOTES
Complaints of mid chest pain started today. History of PE. Pt also states he is having neighbor problems which is causing anxiety. A/Ox3

## 2023-12-25 NOTE — H&P
"History Of Present Illness  Eleazar Burrell is a 59 y/o M with PMHx schizoaffective disorder, DVT/PE (on apixaban), GERD, PTSD, HTN, DLD, who presents to ED for chest pain.  Patient states he onset with chest heaviness that he describes as a faint feeling associated with palpitations earlier today.  Pain had resolved while in the ED, but then came back after decision was made for admission.  He states he thinks he had a panic attack earlier today.  Notes a lot of anxiety and stress in his life.  Having issues with neighbors in his apartment building.  Having fears about his \"safety\" and recently had to call the police on one of his neighbors due to noise.  Follows with a psychiatrist.  Currently denies shortness of breath, abdominal pain, nausea, vomiting.  Denies cardiac history aside from known bradycardia.    Per chart review, pt presented to OSH 9/30/23 with similar complaints. It was felt his chest pain was noncardiac in nature given negative troponins (10-12) and pt was discharged home on 10/2. Was supposed to follow up with Cardiology in October, but no notes in EMR.     ED Course:     Vitals:   T 36.6, HR 61, RR 16, /88, SaO2 96%     Labs:   CBC: BC 6.2, Hgb 13.5, Plt 183   BMP: Na 141, K 3.4, Cl 105, CO2 28, BUN 23, Cr 1.08   AST 19, ALT 12, alk phos 58, t. bili 0.5   Troponin 83 -- > 76     Imaging:     CXR 12/24:     IMPRESSION:  No acute cardiopulmonary process.    ED interventions:   None    Home meds:   Apixaban 5 BID   Aripiprazole injection monthly   Atorva 80   Benztropine 0.5 qHS   Candesartan 16mg   Indapamide 1.25mg   Isosorbide mononitrate 30mg daily   Metformin 500mg daily   Paiperidone 3mg daily   Pantop 20mg daily   Tamsulosin 0.4 mg daily   Trazodone 100mg daily      Past Medical History  Past Medical History:   Diagnosis Date    Other conditions influencing health status 07/21/2013    Glaucoma / Intraocular Pressure       Surgical History  Past Surgical History:   Procedure Laterality " "Date    CT ANGIO CORONARY ART WITH HEARTFLOW IF SCORE >30%  10/7/2022    CT HEART CORONARY ANGIOGRAM 10[place]7/2022 [facility] [place]        Social History  He has no history on file for tobacco use, alcohol use, and drug use.    Family History  No family history on file.     Allergies  Lisinopril and Quinapril    Review of Systems  ROS as per HPI, otherwise reviewed and negative.       Physical Exam  General: pleasant, in NAD  HEENT: normocephalic, atraumatic  CV: heart regular rate and rhythm w/o murmurs  Resp: lungs clear to auscultation bilaterally  Abd: soft, nondistended, nontender  Ext: no peripheral edema  Neuro: grossly nonfocal, speech clear  Psych: somewhat pressured speech        Last Recorded Vitals  Blood pressure (!) 131/95, pulse 51, temperature 36.6 °C (97.9 °F), temperature source Temporal, resp. rate 16, height 1.75 m (5' 8.9\"), weight 87.1 kg (192 lb), SpO2 97 %.    Relevant Results  See hpi section      Assessment/Plan   Principal Problem:    Chest pain due to psychological stress    Eleazar Burrell is a 59 y/o M with PMHx schizoaffective disorder, DVT/PE (on apixaban), GERD, PTSD, HTN, DLD, who presents to ED for chest pain.  Troponins negative and EKG is similar to prior.  Chest x-ray unremarkable as well.  Patient reports significant history of anxiety and thinks he may have had a panic attack.  Agree that pain may be related to uncontrolled anxiety.  Will continue home meds and outpatient follow-up with psychiatrist.  Of note patient was also supposed to follow-up with cardiology after last episode of chest pain.  No notes in our medical record system, will request outpatient follow-up.    #chest pain, likely 2/2 anxiety   - troponin trend: 83 --> 76  - EKG similar to prior  - CXR unremarkable  - increase home pantoprazole to 40mg in case of uncontrolled GERD    #hx of schizoaffective disorder, generalized anxiety   - continue benztropine, paliperidone, trazodone -- could consider adding " hydroxyzine prn if anxiety returns   - f/up with OP Psych provider     #hx of PE  - continue home eliquis     #hx of HTN   - hold home indapamide, isosorbide mononitrate, candesartan     #misc   - engage social work team to help pt find new housing     F: prn   E: prn   N: regular   GI: pantop  DVT: home apixaban     CODE STATUS: FULL CODE   NOK: Martin Hunter (friend) -- f/up with pt on phone number            Chrystal Barnes MD

## 2023-12-25 NOTE — CARE PLAN
Problem: Pain - Adult  Goal: Verbalizes/displays adequate comfort level or baseline comfort level  12/25/2023 1445 by Jae Jeong RN  Outcome: Adequate for Discharge  12/25/2023 1435 by Jae Jeong RN  Outcome: Progressing     Problem: Safety - Adult  Goal: Free from fall injury  12/25/2023 1445 by Jae Jeong RN  Outcome: Adequate for Discharge  12/25/2023 1435 by Jae Jeong RN  Outcome: Progressing     Problem: Discharge Planning  Goal: Discharge to home or other facility with appropriate resources  12/25/2023 1445 by Jae Jeong RN  Outcome: Adequate for Discharge  12/25/2023 1435 by Jae Jeong RN  Outcome: Progressing     Problem: Chronic Conditions and Co-morbidities  Goal: Patient's chronic conditions and co-morbidity symptoms are monitored and maintained or improved  12/25/2023 1445 by Jae Jeong RN  Outcome: Adequate for Discharge  12/25/2023 1435 by Jae Jeong RN  Outcome: Progressing   The patient's goals for the shift include reduction of anxiety    The clinical goals for the shift include patient will verbalize a decrease in anxiety by the end of this shift.

## 2023-12-25 NOTE — ED NOTES
Pharmacy Medication History Review    Eleazar Burrell is a 58 y.o. male admitted for Chest pain due to psychological stress. Pharmacy reviewed the patient's [id number] medications and allergies for accuracy.    The list below reflects the updated PTA list. Comments regarding how patient may be taking medications differently can be found in the Admit Orders Activity  Prior to Admission Medications   Prescriptions Last Dose Informant   ARIPiprazole lauroxil (Aristada) 882 mg/3.2 mL injection     Sig: Inject 3.2 mL (882 mg) into the shoulder, thigh, or buttocks.   acetaminophen (Tylenol) 500 mg tablet     Sig: Take 2 tablets (1,000 mg) by mouth 2 times a day as needed (for Pain).   amLODIPine (Norvasc) 5 mg tablet     Sig: Take 1 tablet (5 mg) by mouth once daily.   apixaban (Eliquis) 5 mg tablet 12/24/2023    Sig: Take 1 tablet (5 mg) by mouth twice a day.   atorvastatin (Lipitor) 80 mg tablet 12/24/2023    Sig: Take 1 tablet (80 mg) by mouth once daily.   benztropine (Cogentin) 0.5 mg tablet 12/24/2023    Sig: Take 1 tablet (0.5 mg) by mouth once daily at bedtime.   candesartan (Atacand) 16 mg tablet 12/24/2023    Sig: Take 1 tablet (16 mg) by mouth once daily.   diclofenac sodium (Voltaren) 1 % gel gel     Sig: Apply 1 Application topically 1 time if needed.   divalproex (Depakote ER) 500 mg 24 hr tablet     Sig: Take 2 tablets (1,000 mg) by mouth once daily at bedtime. Do not crush, chew, or split.   indapamide (Lozol) 1.25 mg tablet 12/24/2023    Sig: Take 1 tablet (1.25 mg) by mouth once daily.   isosorbide mononitrate ER (Imdur) 30 mg 24 hr tablet 12/24/2023    Sig: Take 1 tablet (30 mg) by mouth once daily.   melatonin 3 mg tablet     Sig: Take 1 tablet (3 mg) by mouth as needed at bedtime for sleep.   multivitamin with minerals iron-free (Multivitamin 50 Plus) 12/24/2023    Sig: Take 1 tablet by mouth once daily.   nitroglycerin (Nitrostat) 0.4 mg SL tablet     Sig: Place 1 tablet (0.4 mg) under the  tongue every 5 minutes if needed for chest pain.   paliperidone (Invega) 3 mg 24 hr tablet 12/24/2023    Sig: Take 1 tablet (3 mg) by mouth once daily.   pantoprazole (ProtoNix) 20 mg EC tablet 12/24/2023    Sig: Take by mouth once daily.   polyethylene glycol (Glycolax, Miralax) 17 gram packet     Sig: Take 17 g by mouth 1 time if needed (for Constipation).   tamsulosin (Flomax) 0.4 mg 24 hr capsule 12/25/2023    Sig: Take 1 capsule (0.4 mg) by mouth once daily.   traZODone (Desyrel) 100 mg tablet 12/24/2023    Sig: Take 1 tablet (100 mg) by mouth.      Facility-Administered Medications: None        The list below reflects the updated allergy list. Please review each documented allergy for additional clarification and justification.  Allergies  Reviewed by Aidee Ladd RN on 12/25/2023        Severity Reactions Comments    Lisinopril High Swelling Mouth starts to swell      Other reaction(s): Angioedema    Quinapril Not Specified Angioedema, Hives             Patient accepts M2B at discharge. Pharmacy has been updated to [facility].    Sources used to complete the med history include Patient Interview, Dispense History    Below are additional concerns with the patient's PTA list.  N/A    Israel Tello, BCPS, BCMTMS, [facility]  PGY1 Pharmacy Resident  [phone]    Please reach out via Secure Chat for questions, or if no response call [phone] or [facility]era [facility]

## 2023-12-25 NOTE — CARE PLAN
Problem: Pain - Adult  Goal: Verbalizes/displays adequate comfort level or baseline comfort level  Outcome: Progressing     Problem: Safety - Adult  Goal: Free from fall injury  Outcome: Progressing     Problem: Discharge Planning  Goal: Discharge to home or other facility with appropriate resources  Outcome: Progressing     Problem: Chronic Conditions and Co-morbidities  Goal: Patient's chronic conditions and co-morbidity symptoms are monitored and maintained or improved  Outcome: Progressing   The patient's goals for the shift include reduction of anxiety    The clinical goals for the shift include patient will verbalize a decrease in anxiety by the end of this shift.

## 2023-12-25 NOTE — DISCHARGE SUMMARY
"Discharge Diagnosis  Chest pain due to psychological stress    Issues Requiring Follow-Up  Psychiatry  Primary Care Provider    Test Results Pending At Discharge  Pending Labs       No current pending labs.            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  Patient states he onset with chest heaviness that he describes as a faint feeling associated with palpitations earlier today in the guise of a panic attack. This is a chronic presentation for him in the setting of psychological stress. His pain resolved while in the ED, but then a decision was made for an observational admission whilst a work/up for a cardiac etiology was continued given that he had an elevated troponin titre. He notes a lot of anxiety and stress in his life.  He has been having issues with neighbors in his apartment building.with concerns about his \"safety\" and recently had to call the police on one of his neighbors due to noise.  Whilst in the ED his troponins trended down and an EKG did not show much significant changes from prior ones. On transfer to the regular nursing floor, he was connected with the Social Work team that provided contact resources that he can seek out to help facilitate his want to leave his current domicile. He continued to remain hemodynamically stable and said he would be alright with being discharged to home with plans to follow up with his Psychiatrist as scheduled and will follow through with using the resources just provided. His home script for candesartan was filled given reported low quantities. He was provided with transportation and discharged on 12/25/2023.    Pertinent Physical Exam At Time of Discharge  General: Pleasant, in NAD  HEENT: Normocephalic, atraumatic  CV: Heart regular rate and rhythm w/o murmurs  Resp: Lungs clear to auscultation bilaterally  Abd: Soft, nondistended, " nontender  Ext: No peripheral edema  Neuro: Grossly nonfocal, speech clear and able to articulate thoughts well  Psych: Somewhat pressured speech     Home Medications     Medication List      CHANGE how you take these medications     * candesartan 16 mg tablet; Commonly known as: Atacand; What changed:   Another medication with the same name was added. Make sure you understand   how and when to take each.   * candesartan 16 mg tablet; Commonly known as: Atacand; Take 1 tablet   (16 mg) by mouth once daily.; What changed: You were already taking a   medication with the same name, and this prescription was added. Make sure   you understand how and when to take each.  * This list has 2 medication(s) that are the same as other medications   prescribed for you. Read the directions carefully, and ask your doctor or   other care provider to review them with you.     CONTINUE taking these medications     acetaminophen 500 mg tablet; Commonly known as: Tylenol   amLODIPine 5 mg tablet; Commonly known as: Norvasc   apixaban 5 mg tablet; Commonly known as: Eliquis   ARIPiprazole lauroxil 882 mg/3.2 mL injection; Commonly known as:   Aristada   atorvastatin 80 mg tablet; Commonly known as: Lipitor   benztropine 0.5 mg tablet; Commonly known as: Cogentin   diclofenac sodium 1 % gel gel; Commonly known as: Voltaren   divalproex 500 mg 24 hr tablet; Commonly known as: Depakote ER   Flomax 0.4 mg 24 hr capsule; Generic drug: tamsulosin   indapamide 1.25 mg tablet; Commonly known as: Lozol   isosorbide mononitrate ER 30 mg 24 hr tablet; Commonly known as: Imdur   melatonin 3 mg tablet   Multivitamin 50 Plus; Generic drug: multivitamin with minerals iron-free   nitroglycerin 0.4 mg SL tablet; Commonly known as: Nitrostat   paliperidone 3 mg 24 hr tablet; Commonly known as: Invega   pantoprazole 20 mg EC tablet; Commonly known as: ProtoNix   polyethylene glycol 17 gram packet; Commonly known as: Glycolax, Miralax   traZODone 100 mg  tablet; Commonly known as: Desyrel       Outpatient Follow-Up  Future Appointments   Date Time Provider Department Center   12/26/2023 12:00 AM [facility] [place] [facility]   12/26/2023 12:05 AM [facility] [place] [facility]       Dilan Vazquez MD

## 2023-12-25 NOTE — PROGRESS NOTES
Eleazar Burrell is a 58 y.o. male on day 0 of admission presenting with Chest pain due to psychological stress.    Subjective   Received message from resident as there are concerns regarding patients current housing situation. Patient was admitted due to chest pain from stress.     Called and spoke with patient; states that is current apartment is full of younger tenants and they have caused him a great amount of stress. Patient reports that his upstairs neighbor runs constantly back and forth & he is unable to rest. Patient has involved the police but there has been no resolution. Supportive listening provided to patient. He states that he recently turned 58 & is interested in moving into a senior apartment building. Patient is agreeable for resources to be dropped off at the bedside.    Resources were gathered and provided to the patient.    Update provided to MD that resources were provided; no further intervention from  at this time.    -Arminda RODRÍGUEZ MA, LSW  [phone] or [facility]  Care Transitions

## 2023-12-25 NOTE — DISCHARGE INSTRUCTIONS
Dear Mr. Burrell,    You were admitted to [facility] on 12/25/2023 for concerns of chest pain and a panic attack episode given resent domestic stresses. On evaluation in the ED, our findings did not see find that these were consistent with an acute cardiac event. You reported that these have been chronic with anxiety and psychological stresses. You mentioned stresses with neighbors as being a major recent stressor and wanted to discuss with social Work about housing options given your age of 58 and we facilitated this. Resources have been provided for you to peruse and get in touch with the most appropriate teams/organizations.     You remained hemodynamically whilst admitted and showed interval improvement with the regimen we had you on. We worked with our pharmacists to ensure you were comfortable and your medications were optimized given your comorbidities.     Please continue to take your medications as set up by prior teams as no new medications have been made.    Thanks for choosing .    Medicine Team

## 2023-12-25 NOTE — HOSPITAL COURSE
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  Patient states he onset with chest heaviness that he describes as a faint feeling associated with palpitations earlier today in the guise of a panic attack. This is a chronic presentation for him in the setting of psychological stress. His pain resolved while in the ED, but then a decision was made for an observational admission whilst a work/up for a cardiac etiology was continued given that he had an elevated troponin titre. He notes a lot of anxiety and stress in his life.  He has been having issues with neighbors in his apartment building.with concerns about his \"safety\" and recently had to call the police on one of his neighbors due to noise.  Whilst in the ED his troponins trended down and an EKG did not show much significant changes from prior ones. On transfer to the regular nursing floor, he was connected with the Social Work team that provided contact resources that he can seek out to help facilitate his want to leave his current domicile. He continued to remain hemodynamically stable and said he would be alright with being discharged to home with plans to follow up with his Psychiatrist as scheduled and will follow through with using the resources just provided. His home script for candesartan was filled given reported low quantities. He was provided with transportation and discharged on 12/25/2023.  "

## 2023-12-26 ENCOUNTER — ANCILLARY PROCEDURE (OUTPATIENT)
Dept: EMERGENCY MEDICINE | Facility: HOSPITAL | Age: 58
DRG: 880 | End: 2023-12-26
Payer: COMMERCIAL

## 2023-12-26 PROCEDURE — 93005 ELECTROCARDIOGRAM TRACING: CPT

## 2023-12-26 NOTE — ED PROVIDER NOTES
HPI   Chief Complaint   Patient presents with    Chest Pain    Anxiety       58-year-old male with past medical history of PE on Eliquis hypertension and bipolar disorder who presents today for 2 to 3 days of chest pain.  States that he has been having left-sided and central chest pain that does not radiate into his back into his shoulders or down his arm.  Not associated with diaphoresis nausea vomiting or diarrhea.  He denies any current abdominal pain, the pain is not worsened by exercise which she does do regularly.  He did note that his heart rate was 140s to 150s while exercising the other day, but was performing strenuous exercise.  He denies any new swelling in his legs headache change in vision changes in hearing.  Denies any difficulty swallowing or speaking, numbness weakness tingling his arms or legs.      History provided by:  Patient   used: No                        Onley Coma Scale Score: 15                  Patient History   Past Medical History:   Diagnosis Date    Other conditions influencing health status 07/21/2013    Glaucoma / Intraocular Pressure     Past Surgical History:   Procedure Laterality Date    CT ANGIO CORONARY ART WITH HEARTFLOW IF SCORE >30%  10/7/2022    [place] HEART [place]ONARY [place] [facility] [place] [facility] [place]     Family History   Family history unknown: Yes     Social History     Tobacco Use    Smoking status: Never     Passive exposure: Never    Smokeless tobacco: Never   Vaping Use    Vaping Use: Never used   Substance Use Topics    Alcohol use: Never    Drug use: Never       Physical Exam   ED Triage Vitals   Temp Heart Rate Resp BP   12/24/23 2336 12/24/23 2336 12/24/23 2336 12/24/23 2336   36.6 °C (97.9 °F) 61 16 124/88      SpO2 Temp Source Heart Rate Source Patient Position   12/24/23 2336 12/24/23 2336 12/24/23 2336 12/25/23 0836   96 % Temporal Monitor Sitting      BP Location FiO2 (%)     -- 12/25/23 0722      21 %       Physical Exam  Vitals  and nursing note reviewed.   Constitutional:       General: He is not in acute distress.     Appearance: Normal appearance. He is well-developed. He is not ill-appearing or diaphoretic.   HENT:      Head: Normocephalic and atraumatic.      Mouth/Throat:      Mouth: Mucous membranes are moist.      Pharynx: No oropharyngeal exudate or posterior oropharyngeal erythema.   Eyes:      General: No scleral icterus.     Extraocular Movements: Extraocular movements intact.      Conjunctiva/sclera: Conjunctivae normal.      Pupils: Pupils are equal, round, and reactive to light.   Neck:      Vascular: No JVD.   Cardiovascular:      Rate and Rhythm: Normal rate and regular rhythm.      Pulses: Normal pulses.           Radial pulses are 2+ on the right side and 2+ on the left side.        Dorsalis pedis pulses are 2+ on the right side and 2+ on the left side.      Heart sounds: Normal heart sounds. No murmur heard.     No gallop.   Pulmonary:      Effort: Pulmonary effort is normal. No respiratory distress.      Breath sounds: Normal breath sounds. No stridor. No wheezing, rhonchi or rales.   Chest:      Chest wall: No mass, deformity or tenderness.   Abdominal:      General: Bowel sounds are normal. There is no distension.      Palpations: Abdomen is soft. There is no mass.      Tenderness: There is no abdominal tenderness.      Hernia: No hernia is present.   Musculoskeletal:         General: No swelling, deformity or signs of injury. Normal range of motion.      Cervical back: Normal range of motion and neck supple. No tenderness.      Right lower leg: No tenderness. No edema.      Left lower leg: No tenderness. No edema.   Lymphadenopathy:      Cervical: No cervical adenopathy.   Skin:     General: Skin is warm and dry.      Capillary Refill: Capillary refill takes less than 2 seconds.      Findings: No erythema, lesion or rash.   Neurological:      General: No focal deficit present.      Mental Status: He is alert and  oriented to person, place, and time. Mental status is at baseline.   Psychiatric:         Mood and Affect: Mood normal.         Behavior: Behavior normal.         ED Course & MDM   Diagnoses as of 12/26/23 0222   Chest pain due to psychological stress       Medical Decision Making  58-year-old male with past medical history of hypertension PE on Xarelto and bipolar disorder who presents today for chest pain.  Patient's chest pain appears to be mostly anxiety related, which the patient does endorse, however, he does have cardiac risk factors so we will get a troponin as well as a BNP chest x-ray and EKG.  Patient's EKG is essentially the same as previous.  Patient did have an elevated troponin of 83 that down trended to 76 with a normal CBC and RFP.  I did discuss with him that he is likely appropriate for outpatient follow-up with cardiology.  Patient did endorse that he has difficulty with following up at his appointments, and would prefer to stay for observation.  Given he does have a mildly elevated troponin is higher than his previous, I will admit him for chest pain workup including CTA and possible stress test.  Patient was accepted to the medicine service.  Patient will be signed out to the oncoming team pending a bed upstairs and provocative testing.    Amount and/or Complexity of Data Reviewed  Labs:  Decision-making details documented in ED Course.  Radiology:  Decision-making details documented in ED Course.  ECG/medicine tests:  Decision-making details documented in ED Course.  Discussion of management or test interpretation with external provider(s): EKG demonstrates a sinus bradycardia at a rate of 50s, no QT QRS or DC interval abnormalities.  No ST elevations or depressions consistent with MI, similar to previous EKGs.        Procedure  Procedures     Bogdan Heard MD  Resident  12/26/23 0229

## 2023-12-31 ENCOUNTER — HOSPITAL ENCOUNTER (EMERGENCY)
Facility: HOSPITAL | Age: 58
Discharge: HOME | End: 2023-12-31
Attending: EMERGENCY MEDICINE
Payer: COMMERCIAL

## 2023-12-31 VITALS
WEIGHT: 193 LBS | RESPIRATION RATE: 18 BRPM | DIASTOLIC BLOOD PRESSURE: 69 MMHG | OXYGEN SATURATION: 99 % | BODY MASS INDEX: 29.25 KG/M2 | HEART RATE: 82 BPM | SYSTOLIC BLOOD PRESSURE: 132 MMHG | TEMPERATURE: 98.8 F | HEIGHT: 68 IN

## 2023-12-31 DIAGNOSIS — S05.02XA ABRASION OF LEFT CORNEA, INITIAL ENCOUNTER: Primary | ICD-10-CM

## 2023-12-31 LAB
FLUAV RNA RESP QL NAA+PROBE: NOT DETECTED
FLUBV RNA RESP QL NAA+PROBE: NOT DETECTED
SARS-COV-2 RNA RESP QL NAA+PROBE: NOT DETECTED

## 2023-12-31 PROCEDURE — 90471 IMMUNIZATION ADMIN: CPT

## 2023-12-31 PROCEDURE — 99283 EMERGENCY DEPT VISIT LOW MDM: CPT | Mod: 25

## 2023-12-31 PROCEDURE — 99284 EMERGENCY DEPT VISIT MOD MDM: CPT | Performed by: EMERGENCY MEDICINE

## 2023-12-31 PROCEDURE — 2500000001 HC RX 250 WO HCPCS SELF ADMINISTERED DRUGS (ALT 637 FOR MEDICARE OP): Mod: SE

## 2023-12-31 PROCEDURE — 87636 SARSCOV2 & INF A&B AMP PRB: CPT

## 2023-12-31 PROCEDURE — 90715 TDAP VACCINE 7 YRS/> IM: CPT | Mod: SE

## 2023-12-31 PROCEDURE — 2500000004 HC RX 250 GENERAL PHARMACY W/ HCPCS (ALT 636 FOR OP/ED): Mod: SE

## 2023-12-31 RX ORDER — ERYTHROMYCIN 5 MG/G
1 OINTMENT OPHTHALMIC ONCE
Status: COMPLETED | OUTPATIENT
Start: 2023-12-31 | End: 2023-12-31

## 2023-12-31 RX ORDER — ERYTHROMYCIN 5 MG/G
OINTMENT OPHTHALMIC 4 TIMES DAILY
Qty: 3.5 G | Refills: 0 | Status: SHIPPED | OUTPATIENT
Start: 2023-12-31 | End: 2024-01-10

## 2023-12-31 RX ORDER — ACETAMINOPHEN 325 MG/1
650 TABLET ORAL ONCE
Status: COMPLETED | OUTPATIENT
Start: 2023-12-31 | End: 2023-12-31

## 2023-12-31 RX ORDER — TETRACAINE HYDROCHLORIDE 5 MG/ML
SOLUTION OPHTHALMIC
Status: COMPLETED
Start: 2023-12-31 | End: 2023-12-31

## 2023-12-31 RX ORDER — TETRACAINE HYDROCHLORIDE 5 MG/ML
2 SOLUTION OPHTHALMIC ONCE
Status: COMPLETED | OUTPATIENT
Start: 2023-12-31 | End: 2023-12-31

## 2023-12-31 RX ADMIN — TETRACAINE HYDROCHLORIDE 2 DROP: 5 SOLUTION OPHTHALMIC at 08:10

## 2023-12-31 RX ADMIN — ACETAMINOPHEN 650 MG: 325 TABLET ORAL at 08:07

## 2023-12-31 RX ADMIN — TETANUS TOXOID, REDUCED DIPHTHERIA TOXOID AND ACELLULAR PERTUSSIS VACCINE, ADSORBED 0.5 ML: 5; 2.5; 8; 8; 2.5 SUSPENSION INTRAMUSCULAR at 08:36

## 2023-12-31 RX ADMIN — FLUORESCEIN SODIUM 1 STRIP: 1 STRIP OPHTHALMIC at 07:40

## 2023-12-31 RX ADMIN — ERYTHROMYCIN 1 CM: 5 OINTMENT OPHTHALMIC at 08:36

## 2023-12-31 ASSESSMENT — VISUAL ACUITY: OU: 1

## 2023-12-31 ASSESSMENT — PAIN - FUNCTIONAL ASSESSMENT: PAIN_FUNCTIONAL_ASSESSMENT: 0-10

## 2023-12-31 ASSESSMENT — LIFESTYLE VARIABLES
EVER HAD A DRINK FIRST THING IN THE MORNING TO STEADY YOUR NERVES TO GET RID OF A HANGOVER: NO
HAVE PEOPLE ANNOYED YOU BY CRITICIZING YOUR DRINKING: NO
EVER FELT BAD OR GUILTY ABOUT YOUR DRINKING: NO
HAVE YOU EVER FELT YOU SHOULD CUT DOWN ON YOUR DRINKING: NO
REASON UNABLE TO ASSESS: NO

## 2023-12-31 NOTE — ED PROVIDER NOTES
HPI   Chief Complaint   Patient presents with    Eye Problem     Per patient stated has an eye lash to left eye      History provided by:  Patient and medical records    Eleazar Burrell is a 58 y.o. male who presented to the [facility] ED with chief complaint of left eye redness and pain. PMHx of glaucoma, DVT/PE (on Eliquis), GERD, HTN, schizoaffective disorder, PSTD, and depression/anxiety.    Patient states that he noticed redness and pain of left eye this morning. He states that he feels as if there is an eyelash under his upper lid. Patient states that he flushed is left eye several times with tap water. Patient denies recent trauma to the eye or face. Patient denies drainage from the left eye. He complains of mildly blurred vision of the left eye. Denies sensitivity to light. Patient states he forgot his glasses.     Patient complains of nasal congestion, which he also first noticed this morning. Patient denies fever/chills, cough, SOB, CP, or N/V/D. No further complaints.     Ayesha Coma Scale Score: 15    Patient History   Past Medical History:   Diagnosis Date    Other conditions influencing health status 07/21/2013    Glaucoma / Intraocular Pressure     Past Surgical History:   Procedure Laterality Date    CT ANGIO CORONARY ART WITH HEARTFLOW IF SCORE >30%  10/7/2022    CT HEART CORONARY ANGIOGRAM 10/7/2022 [facility] [place] [facility]     Family History   Family history unknown: Yes     Social History     Tobacco Use    Smoking status: Never     Passive exposure: Never    Smokeless tobacco: Never   Vaping Use    Vaping Use: Never used   Substance Use Topics    Alcohol use: Never    Drug use: Never     Physical Exam   ED Triage Vitals [12/31/23 0627]   Temp Heart Rate Resp BP   37.1 °C (98.8 °F) 87 20 131/71      SpO2 Temp Source Heart Rate Source Patient Position   99 % Tympanic Monitor Sitting      BP Location FiO2 (%)     Right arm --       Physical Exam  Constitutional:       General: He is not in acute  distress.     Appearance: Normal appearance. He is normal weight.   HENT:      Head: Normocephalic and atraumatic.      Right Ear: External ear normal.      Left Ear: External ear normal.      Nose: Congestion present. No rhinorrhea.      Mouth/Throat:      Mouth: Mucous membranes are moist.      Pharynx: Oropharynx is clear.   Eyes:      General: Lids are normal. Vision grossly intact.         Right eye: No foreign body or discharge.         Left eye: No foreign body or discharge.      Extraocular Movements: Extraocular movements intact.      Right eye: No nystagmus.      Left eye: No nystagmus.      Pupils: Pupils are equal, round, and reactive to light.      Comments: No photophobia. Erythema of conjunctiva, left  Visual acuity 20/70 on L, 20/40 on R,  uncorrected  Under Woods lamp examination with fluorescein, increased uptake noted to a 0.5 cm abrasion of sclera overlying the left pupil extending toward 3 o'clock direction   Cardiovascular:      Rate and Rhythm: Normal rate and regular rhythm.   Pulmonary:      Effort: Pulmonary effort is normal. No respiratory distress.   Abdominal:      Palpations: Abdomen is soft.      Tenderness: There is no abdominal tenderness.   Musculoskeletal:         General: Normal range of motion.      Cervical back: Normal range of motion and neck supple.   Skin:     General: Skin is warm and dry.   Neurological:      General: No focal deficit present.      Mental Status: He is alert and oriented to person, place, and time. Mental status is at baseline.      Motor: No weakness.   Psychiatric:         Mood and Affect: Mood normal.         Behavior: Behavior normal.         Thought Content: Thought content normal.         Judgment: Judgment normal.       ED Course & MDM   Diagnoses as of 12/31/23 0833   Abrasion of left cornea, initial encounter     Medical Decision Making  Eleazar Burrell is a 58 y.o. male who presented to the [facility] ED with chief complaint of left eye redness and  pain. PMHx of glaucoma, DVT/PE (on Eliquis), GERD, HTN, schizoaffective disorder, PSTD, and depression/anxiety.    Patient states that he noticed redness and pain of left eye this morning. He states that he feels as if there is an eyelash under his upper lid. Patient states that he flushed is left eye several times with tap water. Patient denies recent trauma to the eye or face. Patient denies drainage from the left eye. He complains of mildly blurred vision of the left eye. Denies sensitivity to light. Patient states he forgot his glasses.     Upon arrival, patient hemodynamically stable and afebrile. Visual acuity 20/70 on the left, 20/40 on the right, uncorrected. After administering tetracaine 0.5% ophthalmic solution for analgesia to left eye, Woods lamp examination with fluorescein stain revealed increased uptake noted to a 0.5 cm abrasion of sclera overlying the left pupil extending toward the 3 o'clock direction.     Last tetanus vaccination was >5 years ago per patient. Patient received one dose of BoostRIX today.    Erythromycin ophthalmic ointment was applied to left eye. Instructions discussed for proper application. Patient voiced his understanding. Rx sent to pharmacy for erythromycin ophthalmic ointment, apply to left eye 4 times daily.     Covid/influenza swab ordered given cold/flu symptoms, results not yet available at time of discharge. Patient uses MyChart and states he will check for the results later today.     Advised patient to follow up with PCP as soon as possible, preferably tomorrow. Instructed patient to return to the ED for new or worsening symptoms, including drainage, vision changes or worsening pain. Patient voiced his understanding. Patient discharged to home in stable condition.    Patient examined and evaluated with attending physician.      Laisha Solorzano DPM  Resident  12/31/23 7834

## 2024-03-22 ENCOUNTER — HOSPITAL ENCOUNTER (EMERGENCY)
Facility: HOSPITAL | Age: 59
Discharge: HOME | End: 2024-03-22
Payer: COMMERCIAL

## 2024-03-22 VITALS
SYSTOLIC BLOOD PRESSURE: 149 MMHG | HEART RATE: 81 BPM | WEIGHT: 195 LBS | DIASTOLIC BLOOD PRESSURE: 75 MMHG | RESPIRATION RATE: 16 BRPM | OXYGEN SATURATION: 100 % | TEMPERATURE: 97.7 F | BODY MASS INDEX: 29.55 KG/M2 | HEIGHT: 68 IN

## 2024-03-22 DIAGNOSIS — H43.392 VITREOUS FLOATERS OF LEFT EYE: Primary | ICD-10-CM

## 2024-03-22 PROCEDURE — 99282 EMERGENCY DEPT VISIT SF MDM: CPT

## 2024-03-22 PROCEDURE — 99284 EMERGENCY DEPT VISIT MOD MDM: CPT | Performed by: PHYSICIAN ASSISTANT

## 2024-03-22 ASSESSMENT — COLUMBIA-SUICIDE SEVERITY RATING SCALE - C-SSRS
2. HAVE YOU ACTUALLY HAD ANY THOUGHTS OF KILLING YOURSELF?: NO
6. HAVE YOU EVER DONE ANYTHING, STARTED TO DO ANYTHING, OR PREPARED TO DO ANYTHING TO END YOUR LIFE?: NO
1. IN THE PAST MONTH, HAVE YOU WISHED YOU WERE DEAD OR WISHED YOU COULD GO TO SLEEP AND NOT WAKE UP?: NO

## 2024-03-22 ASSESSMENT — VISUAL ACUITY
OD: 20/50
OS: 20/40
OU: 20/40

## 2024-03-22 NOTE — SIGNIFICANT EVENT
Patient left ED prior to being seen by ophthalmology resident. Per ED staff, patient decided to go to [facility].    Rachel Liao MD PhD

## 2024-03-22 NOTE — ED PROVIDER NOTES
Emergency Department Encounter  [facility] EMERGENCY MEDICINE    Patient: Eleazar Burrell  MRN: [id number]  : 1965  Date of Evaluation: 3/22/2024  ED Provider: Antonina Dickinson PA-C      Chief Complaint       Chief Complaint   Patient presents with    Eye Problem     HPI    Eleazar Burrell is a 58 y.o. male who presents to the emergency department presenting for ophthalmology evaluation.  Patient was sent in by his PCP today.  PMH of left eye posterior vitreous detachment that was noted at a [facility] visit approximately 1 month ago.  Patient was informed to return to the emergency department if his vision acutely changes.  States this morning when he was attempting to do his homework he noticed that the vision to his left eye got significantly worse.  Has a haziness/blurriness to his vision to the left eye.  No complete loss of vision.  Minimal associated pain, concern that the eye is red.  States that it was slightly tearing this morning, which has since resolved.  No eye trauma.  Wears glasses, no contact lens use. Endorses chronic floater x1 month, larger today and blocking his vision.    ROS:     Review of Systems  14 systems reviewed and otherwise acutely negative except as in the HPI.    Past History     Past Medical History:   Diagnosis Date    Other conditions influencing health status 2013    Glaucoma / Intraocular Pressure     Past Surgical History:   Procedure Laterality Date    CT ANGIO CORONARY ART WITH HEARTFLOW IF SCORE >30%  10/7/2022    CT HEART CORONARY ANGIOGRAM 10/7/2022 [facility]ERGENCY [facility]     Social History     Socioeconomic History    Marital status: Single     Spouse name: Not on file    Number of children: Not on file    Years of education: Not on file    Highest education level: Not on file   Occupational History    Not on file   Tobacco Use    Smoking status: Never     Passive exposure: Never    Smokeless tobacco: Never   Vaping Use    Vaping  Use: Never used   Substance and Sexual Activity    Alcohol use: Never    Drug use: Never    Sexual activity: Never   Other Topics Concern    Not on file   Social History Narrative    Not on file     Social Determinants of Health     Financial Resource Strain: Low Risk  (12/25/2023)    Overall Financial Resource Strain (CARDIA)     Difficulty of Paying Living Expenses: Not very hard   Food Insecurity: No Food Insecurity (12/25/2023)    Hunger Vital Sign     Worried About Running Out of Food in the Last Year: Never true     Ran Out of Food in the Last Year: Never true   Transportation Needs: No Transportation Needs (12/25/2023)    PRAPARE - Transportation     Lack of Transportation (Medical): No     Lack of Transportation (Non-Medical): No   Physical Activity: Patient Declined (12/25/2023)    Exercise Vital Sign     Days of Exercise per Week: Patient declined     Minutes of Exercise per Session: Patient declined   Stress: Stress Concern Present (12/25/2023)    Citizen of [place] of Occupational Health - Occupational Stress Questionnaire     Feeling of Stress : Very much   Social Connections: Unknown (12/25/2023)    Social Connection and Isolation Panel [NHANES]     Frequency of Communication with Friends and Family: Three times a week     Frequency of Social Gatherings with Friends and Family: Patient declined     Attends Rastafarian Services: More than 4 times per year     Active Member of Clubs or Organizations: Yes     Attends Club or Organization Meetings: More than 4 times per year     Marital Status: Patient declined   Intimate Partner Violence: Not At Risk (12/25/2023)    Humiliation, Afraid, Rape, and Kick questionnaire     Fear of Current or Ex-Partner: No     Emotionally Abused: No     Physically Abused: No     Sexually Abused: No   Housing Stability: Low Risk  (12/25/2023)    Housing Stability Vital Sign     Unable to Pay for Housing in the Last Year: No     Number of Places Lived in the Last Year: 1      Unstable Housing in the Last Year: No       Medications/Allergies     Previous Medications    ACETAMINOPHEN (TYLENOL) 500 MG TABLET    Take 2 tablets (1,000 mg) by mouth 2 times a day as needed (for Pain).    AMLODIPINE (NORVASC) 5 MG TABLET    Take 1 tablet (5 mg) by mouth once daily.    APIXABAN (ELIQUIS) 5 MG TABLET    Take 1 tablet (5 mg) by mouth twice a day.    ARIPIPRAZOLE LAUROXIL (ARISTADA) 882 MG/3.2 ML INJECTION    Inject 3.2 mL (882 mg) into the shoulder, thigh, or buttocks.    ATORVASTATIN (LIPITOR) 80 MG TABLET    Take 1 tablet (80 mg) by mouth once daily.    BENZTROPINE (COGENTIN) 0.5 MG TABLET    Take 1 tablet (0.5 mg) by mouth once daily at bedtime.    CANDESARTAN (ATACAND) 16 MG TABLET    Take 1 tablet (16 mg) by mouth once daily.    CANDESARTAN (ATACAND) 16 MG TABLET    Take 1 tablet (16 mg) by mouth once daily.    DICLOFENAC SODIUM (VOLTAREN) 1 % GEL GEL    Apply 1 Application topically 1 time if needed.    DIVALPROEX (DEPAKOTE ER) 500 MG 24 HR TABLET    Take 2 tablets (1,000 mg) by mouth once daily at bedtime. Do not crush, chew, or split.    INDAPAMIDE (LOZOL) 1.25 MG TABLET    Take 1 tablet (1.25 mg) by mouth once daily.    ISOSORBIDE MONONITRATE ER (IMDUR) 30 MG 24 HR TABLET    Take 1 tablet (30 mg) by mouth once daily.    MELATONIN 3 MG TABLET    Take 1 tablet (3 mg) by mouth as needed at bedtime for sleep.    MULTIVITAMIN WITH MINERALS IRON-FREE (MULTIVITAMIN 50 PLUS)    Take 1 tablet by mouth once daily.    NITROGLYCERIN (NITROSTAT) 0.4 MG SL TABLET    Place 1 tablet (0.4 mg) under the tongue every 5 minutes if needed for chest pain.    PALIPERIDONE (INVEGA) 3 MG 24 HR TABLET    Take 1 tablet (3 mg) by mouth once daily.    PANTOPRAZOLE (PROTONIX) 20 MG EC TABLET    Take by mouth once daily.    POLYETHYLENE GLYCOL (GLYCOLAX, MIRALAX) 17 GRAM PACKET    Take 17 g by mouth 1 time if needed (for Constipation).    TAMSULOSIN (FLOMAX) 0.4 MG 24 HR CAPSULE    Take 1 capsule (0.4 mg) by mouth once  "daily.    TRAZODONE (DESYREL) 100 MG TABLET    Take 1 tablet (100 mg) by mouth.     Allergies   Allergen Reactions    Lisinopril Swelling and Angioedema     Mouth starts to swell      Other reaction(s): Angioedema    Copper Other     patient states his brain feels \"toxic \" when he wears copper jewelry .      and feel like he has reddened skin     patient states his brain feels \"toxic \" when he wears copper jewelry .  and feel like he has reddened skin    Quinapril Angioedema and Hives        Physical Exam       ED Triage Vitals [03/22/24 1104]   Temperature Heart Rate Respirations BP   36.5 °C (97.7 °F) 81 16 149/75      Pulse Ox Temp Source Heart Rate Source Patient Position   100 % Temporal -- Sitting      BP Location FiO2 (%)     Right arm --         Physical Exam    Physical Exam:     VS: As documented in the triage note and EMR flowsheet from this visit were reviewed.    Appearance: Alert, oriented, cooperative, in no acute distress. Well nourished & well hydrated.    Skin: Warm, intact and dry.     Eyes: PERRLA, EOMs intact. No pain with EOMs. No nystagmus. L sclera slightly injected. No tearing or exudates. Globes soft and nontender.  Visual acuity: L 20/40, R 20/50, b/l 20/40 with glasses    Neck: Supple, without meningismus.     Pulmonary: Clear bilaterally with good chest wall excursion. No rales, rhonchi or wheezing. No accessory muscle use or stridor.    Cardiac: Normal S1, S2     Musculoskeletal: Spontaneously moving all extremities without limitation. Extremities warm and well-perfused.    Neurological:  Cranial nerves II through XII are grossly intact.      Diagnostics   Not applicable    ED Course   Visit Vitals  /75 (BP Location: Right arm, Patient Position: Sitting)   Pulse 81   Temp 36.5 °C (97.7 °F) (Temporal)   Resp 16   Ht 1.727 m (5' 8\")   Wt 88.5 kg (195 lb)   SpO2 100%   BMI 29.65 kg/m²   Smoking Status Never   BSA 2.06 m²     Medications - No data to display    Medical Decision Making "     Diagnoses as of 03/22/24 1334   Vitreous floaters of left eye   Chart review performed. Ophthalmology consulted and pending their evaluation at this time.  Ophthalmology at bedside 1330 - informed at approx 1328 that this patient chose to leave the department stateing he was going to [facility] ED so he didn't have to wait any longer. Left without treatment complete.      Final Impression      1. Vitreous floaters of left eye          DISPOSITION  Disposition: [facility]  Patient condition is: Stable    Comment: Please note this report has been produced using speech recognition software and may contain errors related to that system including errors in grammar, punctuation, and spelling, as well as words and phrases that may be inappropriate.  If there are any questions or concerns please feel free to contact the dictating provider for clarification.    VAN Gupta PA-C  03/22/24 0226

## 2024-03-22 NOTE — ED TRIAGE NOTES
"Pt reports he has floaters in his left eye for a month. Pt reports he was seen at [facility] for it and was told he has floaters that are \"tearing away\" and he was supposed to follow up if problems persist. States it is impairing his vision   "

## 2024-07-20 ENCOUNTER — HOSPITAL ENCOUNTER (OUTPATIENT)
Facility: HOSPITAL | Age: 59
Setting detail: OBSERVATION
Discharge: HOME | End: 2024-07-20
Attending: EMERGENCY MEDICINE | Admitting: INTERNAL MEDICINE
Payer: MEDICAID

## 2024-07-20 ENCOUNTER — CLINICAL SUPPORT (OUTPATIENT)
Dept: EMERGENCY MEDICINE | Facility: HOSPITAL | Age: 59
End: 2024-07-20
Payer: MEDICAID

## 2024-07-20 VITALS
RESPIRATION RATE: 16 BRPM | SYSTOLIC BLOOD PRESSURE: 134 MMHG | TEMPERATURE: 98.1 F | DIASTOLIC BLOOD PRESSURE: 77 MMHG | HEART RATE: 68 BPM | OXYGEN SATURATION: 98 %

## 2024-07-20 DIAGNOSIS — R07.9 CHEST PAIN DUE TO PSYCHOLOGICAL STRESS: ICD-10-CM

## 2024-07-20 DIAGNOSIS — Z76.89 ENCOUNTER FOR PSYCHIATRIC ASSESSMENT: Primary | ICD-10-CM

## 2024-07-20 DIAGNOSIS — R07.9 CHEST PAIN, UNSPECIFIED TYPE: ICD-10-CM

## 2024-07-20 DIAGNOSIS — F43.9 CHEST PAIN DUE TO PSYCHOLOGICAL STRESS: ICD-10-CM

## 2024-07-20 PROBLEM — F66 OTHER SEXUAL DISORDERS: Chronic | Status: ACTIVE | Noted: 2023-12-20

## 2024-07-20 PROBLEM — E66.9 OBESITY: Status: RESOLVED | Noted: 2022-12-20 | Resolved: 2024-07-20

## 2024-07-20 PROBLEM — I26.99 PULMONARY EMBOLISM (MULTI): Status: RESOLVED | Noted: 2022-12-20 | Resolved: 2024-07-20

## 2024-07-20 LAB
ALBUMIN SERPL BCP-MCNC: 3.7 G/DL (ref 3.4–5)
ALP SERPL-CCNC: 62 U/L (ref 33–136)
ALT SERPL W P-5'-P-CCNC: 13 U/L (ref 10–52)
AMPHETAMINES UR QL SCN: NORMAL
ANION GAP SERPL CALC-SCNC: 11 MMOL/L (ref 10–20)
APAP SERPL-MCNC: <10 UG/ML
AST SERPL W P-5'-P-CCNC: 24 U/L (ref 9–39)
ATRIAL RATE: 52 BPM
BARBITURATES UR QL SCN: NORMAL
BASOPHILS # BLD AUTO: 0.03 X10*3/UL (ref 0–0.1)
BASOPHILS NFR BLD AUTO: 0.6 %
BENZODIAZ UR QL SCN: NORMAL
BILIRUB SERPL-MCNC: 0.5 MG/DL (ref 0–1.2)
BUN SERPL-MCNC: 18 MG/DL (ref 6–23)
BZE UR QL SCN: NORMAL
CALCIUM SERPL-MCNC: 9.1 MG/DL (ref 8.6–10.6)
CANNABINOIDS UR QL SCN: NORMAL
CARDIAC TROPONIN I PNL SERPL HS: 62 NG/L (ref 0–53)
CARDIAC TROPONIN I PNL SERPL HS: 67 NG/L (ref 0–53)
CHLORIDE SERPL-SCNC: 103 MMOL/L (ref 98–107)
CO2 SERPL-SCNC: 30 MMOL/L (ref 21–32)
CREAT SERPL-MCNC: 1.12 MG/DL (ref 0.5–1.3)
EGFRCR SERPLBLD CKD-EPI 2021: 75 ML/MIN/1.73M*2
EOSINOPHIL # BLD AUTO: 0.13 X10*3/UL (ref 0–0.7)
EOSINOPHIL NFR BLD AUTO: 2.5 %
ERYTHROCYTE [DISTWIDTH] IN BLOOD BY AUTOMATED COUNT: 13.1 % (ref 11.5–14.5)
ETHANOL SERPL-MCNC: <10 MG/DL
FENTANYL+NORFENTANYL UR QL SCN: NORMAL
GLUCOSE SERPL-MCNC: 85 MG/DL (ref 74–99)
HCT VFR BLD AUTO: 40.3 % (ref 41–52)
HGB BLD-MCNC: 14.5 G/DL (ref 13.5–17.5)
HOLD SPECIMEN: NORMAL
IMM GRANULOCYTES # BLD AUTO: 0 X10*3/UL (ref 0–0.7)
IMM GRANULOCYTES NFR BLD AUTO: 0 % (ref 0–0.9)
LYMPHOCYTES # BLD AUTO: 2.98 X10*3/UL (ref 1.2–4.8)
LYMPHOCYTES NFR BLD AUTO: 56.4 %
MCH RBC QN AUTO: 30.7 PG (ref 26–34)
MCHC RBC AUTO-ENTMCNC: 36 G/DL (ref 32–36)
MCV RBC AUTO: 85 FL (ref 80–100)
METHADONE UR QL SCN: NORMAL
MONOCYTES # BLD AUTO: 0.4 X10*3/UL (ref 0.1–1)
MONOCYTES NFR BLD AUTO: 7.6 %
NEUTROPHILS # BLD AUTO: 1.74 X10*3/UL (ref 1.2–7.7)
NEUTROPHILS NFR BLD AUTO: 32.9 %
NRBC BLD-RTO: 0 /100 WBCS (ref 0–0)
OPIATES UR QL SCN: NORMAL
OXYCODONE+OXYMORPHONE UR QL SCN: NORMAL
P AXIS: 20 DEGREES
P OFFSET: 202 MS
P ONSET: 148 MS
PCP UR QL SCN: NORMAL
PLATELET # BLD AUTO: 176 X10*3/UL (ref 150–450)
POTASSIUM SERPL-SCNC: 3.4 MMOL/L (ref 3.5–5.3)
PR INTERVAL: 158 MS
PROT SERPL-MCNC: 6.8 G/DL (ref 6.4–8.2)
Q ONSET: 227 MS
QRS COUNT: 8 BEATS
QRS DURATION: 88 MS
QT INTERVAL: 406 MS
QTC CALCULATION(BAZETT): 377 MS
QTC FREDERICIA: 387 MS
R AXIS: -17 DEGREES
RBC # BLD AUTO: 4.72 X10*6/UL (ref 4.5–5.9)
SALICYLATES SERPL-MCNC: <3 MG/DL
SODIUM SERPL-SCNC: 141 MMOL/L (ref 136–145)
T AXIS: 99 DEGREES
T OFFSET: 430 MS
VENTRICULAR RATE: 52 BPM
WBC # BLD AUTO: 5.3 X10*3/UL (ref 4.4–11.3)

## 2024-07-20 PROCEDURE — G0378 HOSPITAL OBSERVATION PER HR: HCPCS

## 2024-07-20 PROCEDURE — 93005 ELECTROCARDIOGRAM TRACING: CPT

## 2024-07-20 PROCEDURE — 80143 DRUG ASSAY ACETAMINOPHEN: CPT

## 2024-07-20 PROCEDURE — 2500000001 HC RX 250 WO HCPCS SELF ADMINISTERED DRUGS (ALT 637 FOR MEDICARE OP): Mod: SE | Performed by: EMERGENCY MEDICINE

## 2024-07-20 PROCEDURE — 99285 EMERGENCY DEPT VISIT HI MDM: CPT

## 2024-07-20 PROCEDURE — 80307 DRUG TEST PRSMV CHEM ANLYZR: CPT

## 2024-07-20 PROCEDURE — 84484 ASSAY OF TROPONIN QUANT: CPT

## 2024-07-20 PROCEDURE — 93010 ELECTROCARDIOGRAM REPORT: CPT | Performed by: EMERGENCY MEDICINE

## 2024-07-20 PROCEDURE — 36415 COLL VENOUS BLD VENIPUNCTURE: CPT

## 2024-07-20 PROCEDURE — 85025 COMPLETE CBC W/AUTO DIFF WBC: CPT

## 2024-07-20 PROCEDURE — 84484 ASSAY OF TROPONIN QUANT: CPT | Performed by: EMERGENCY MEDICINE

## 2024-07-20 PROCEDURE — 99285 EMERGENCY DEPT VISIT HI MDM: CPT | Performed by: EMERGENCY MEDICINE

## 2024-07-20 PROCEDURE — 99235 HOSP IP/OBS SAME DATE MOD 70: CPT | Performed by: INTERNAL MEDICINE

## 2024-07-20 PROCEDURE — 2500000001 HC RX 250 WO HCPCS SELF ADMINISTERED DRUGS (ALT 637 FOR MEDICARE OP): Mod: SE

## 2024-07-20 PROCEDURE — 80053 COMPREHEN METABOLIC PANEL: CPT

## 2024-07-20 RX ORDER — NITROGLYCERIN 0.4 MG/1
0.4 TABLET SUBLINGUAL EVERY 5 MIN PRN
Status: DISCONTINUED | OUTPATIENT
Start: 2024-07-20 | End: 2024-07-20 | Stop reason: HOSPADM

## 2024-07-20 RX ORDER — PANTOPRAZOLE SODIUM 40 MG/1
40 TABLET, DELAYED RELEASE ORAL DAILY
Status: DISCONTINUED | OUTPATIENT
Start: 2024-07-20 | End: 2024-07-20 | Stop reason: HOSPADM

## 2024-07-20 RX ORDER — RISPERIDONE 0.5 MG/1
0.5 TABLET ORAL 2 TIMES DAILY
Status: DISCONTINUED | OUTPATIENT
Start: 2024-07-20 | End: 2024-07-20 | Stop reason: HOSPADM

## 2024-07-20 RX ORDER — TRAZODONE HYDROCHLORIDE 50 MG/1
100 TABLET ORAL NIGHTLY
Status: DISCONTINUED | OUTPATIENT
Start: 2024-07-20 | End: 2024-07-20

## 2024-07-20 RX ORDER — ISOSORBIDE MONONITRATE 30 MG/1
30 TABLET, EXTENDED RELEASE ORAL DAILY
Status: DISCONTINUED | OUTPATIENT
Start: 2024-07-20 | End: 2024-07-20 | Stop reason: HOSPADM

## 2024-07-20 RX ORDER — DICLOFENAC SODIUM 10 MG/G
4 GEL TOPICAL ONCE AS NEEDED
Status: DISCONTINUED | OUTPATIENT
Start: 2024-07-20 | End: 2024-07-20 | Stop reason: HOSPADM

## 2024-07-20 RX ORDER — ACETAMINOPHEN 325 MG/1
1000 TABLET ORAL 2 TIMES DAILY PRN
Status: DISCONTINUED | OUTPATIENT
Start: 2024-07-20 | End: 2024-07-20 | Stop reason: HOSPADM

## 2024-07-20 RX ORDER — MULTIVIT-MIN/IRON FUM/FOLIC AC 7.5 MG-4
1 TABLET ORAL
Status: DISCONTINUED | OUTPATIENT
Start: 2024-07-20 | End: 2024-07-20 | Stop reason: HOSPADM

## 2024-07-20 RX ORDER — DIVALPROEX SODIUM 500 MG/1
1000 TABLET, FILM COATED, EXTENDED RELEASE ORAL NIGHTLY
Status: DISCONTINUED | OUTPATIENT
Start: 2024-07-20 | End: 2024-07-20 | Stop reason: HOSPADM

## 2024-07-20 RX ORDER — ATORVASTATIN CALCIUM 80 MG/1
80 TABLET, FILM COATED ORAL DAILY
Status: DISCONTINUED | OUTPATIENT
Start: 2024-07-20 | End: 2024-07-20 | Stop reason: HOSPADM

## 2024-07-20 RX ORDER — POTASSIUM CHLORIDE 1.5 G/1.58G
40 POWDER, FOR SOLUTION ORAL ONCE
Status: COMPLETED | OUTPATIENT
Start: 2024-07-20 | End: 2024-07-20

## 2024-07-20 RX ORDER — TAMSULOSIN HYDROCHLORIDE 0.4 MG/1
0.4 CAPSULE ORAL DAILY
Status: DISCONTINUED | OUTPATIENT
Start: 2024-07-20 | End: 2024-07-20 | Stop reason: HOSPADM

## 2024-07-20 RX ORDER — TRAZODONE HYDROCHLORIDE 50 MG/1
100 TABLET ORAL NIGHTLY
Status: DISCONTINUED | OUTPATIENT
Start: 2024-07-20 | End: 2024-07-20 | Stop reason: HOSPADM

## 2024-07-20 RX ORDER — VALSARTAN 160 MG/1
160 TABLET ORAL DAILY
Status: DISCONTINUED | OUTPATIENT
Start: 2024-07-20 | End: 2024-07-20 | Stop reason: HOSPADM

## 2024-07-20 RX ORDER — AMOXICILLIN 250 MG
2 CAPSULE ORAL 2 TIMES DAILY
Status: DISCONTINUED | OUTPATIENT
Start: 2024-07-20 | End: 2024-07-20 | Stop reason: HOSPADM

## 2024-07-20 RX ORDER — ENOXAPARIN SODIUM 100 MG/ML
40 INJECTION SUBCUTANEOUS EVERY 24 HOURS
Status: DISCONTINUED | OUTPATIENT
Start: 2024-07-20 | End: 2024-07-20

## 2024-07-20 RX ORDER — POLYETHYLENE GLYCOL 3350 17 G/17G
17 POWDER, FOR SOLUTION ORAL ONCE AS NEEDED
Status: DISCONTINUED | OUTPATIENT
Start: 2024-07-20 | End: 2024-07-20 | Stop reason: HOSPADM

## 2024-07-20 RX ORDER — POLYETHYLENE GLYCOL 3350 17 G/17G
17 POWDER, FOR SOLUTION ORAL DAILY
Status: DISCONTINUED | OUTPATIENT
Start: 2024-07-20 | End: 2024-07-20 | Stop reason: HOSPADM

## 2024-07-20 RX ORDER — TALC
3 POWDER (GRAM) TOPICAL NIGHTLY PRN
Status: DISCONTINUED | OUTPATIENT
Start: 2024-07-20 | End: 2024-07-20 | Stop reason: HOSPADM

## 2024-07-20 RX ORDER — POTASSIUM CHLORIDE 20 MEQ/1
40 TABLET, EXTENDED RELEASE ORAL ONCE
Status: DISCONTINUED | OUTPATIENT
Start: 2024-07-20 | End: 2024-07-20 | Stop reason: HOSPADM

## 2024-07-20 RX ORDER — AMLODIPINE BESYLATE 5 MG/1
5 TABLET ORAL DAILY
Status: DISCONTINUED | OUTPATIENT
Start: 2024-07-20 | End: 2024-07-20 | Stop reason: HOSPADM

## 2024-07-20 RX ORDER — NAPROXEN SODIUM 220 MG/1
324 TABLET, FILM COATED ORAL ONCE
Status: COMPLETED | OUTPATIENT
Start: 2024-07-20 | End: 2024-07-20

## 2024-07-20 RX ORDER — BENZTROPINE MESYLATE 0.5 MG/1
0.5 TABLET ORAL NIGHTLY
Status: DISCONTINUED | OUTPATIENT
Start: 2024-07-20 | End: 2024-07-20 | Stop reason: HOSPADM

## 2024-07-20 RX ORDER — INDAPAMIDE 1.25 MG/1
1.25 TABLET ORAL DAILY
Status: DISCONTINUED | OUTPATIENT
Start: 2024-07-21 | End: 2024-07-20 | Stop reason: HOSPADM

## 2024-07-20 SDOH — ECONOMIC STABILITY: HOUSING INSECURITY: FEELS SAFE LIVING IN HOME: YES

## 2024-07-20 SDOH — HEALTH STABILITY: MENTAL HEALTH: IN THE PAST FEW WEEKS, HAVE YOU WISHED YOU WERE DEAD?: NO

## 2024-07-20 SDOH — HEALTH STABILITY: MENTAL HEALTH: BEHAVIORS/MOOD: FLAT AFFECT;WITHDRAWN;SAD

## 2024-07-20 SDOH — HEALTH STABILITY: MENTAL HEALTH: DEPRESSION SYMPTOMS: NO PROBLEMS REPORTED OR OBSERVED.

## 2024-07-20 SDOH — HEALTH STABILITY: MENTAL HEALTH

## 2024-07-20 SDOH — HEALTH STABILITY: MENTAL HEALTH: SUICIDAL BEHAVIOR (3 MONTHS): NO

## 2024-07-20 SDOH — HEALTH STABILITY: MENTAL HEALTH: IN THE PAST FEW WEEKS, HAVE YOU FELT THAT YOU OR YOUR FAMILY WOULD BE BETTER OFF IF YOU WERE DEAD?: NO

## 2024-07-20 SDOH — HEALTH STABILITY: MENTAL HEALTH: SUICIDAL BEHAVIOR (LIFETIME): YES

## 2024-07-20 SDOH — HEALTH STABILITY: MENTAL HEALTH: IN THE PAST WEEK, HAVE YOU BEEN HAVING THOUGHTS ABOUT KILLING YOURSELF?: NO

## 2024-07-20 SDOH — HEALTH STABILITY: MENTAL HEALTH: NON-SPECIFIC ACTIVE SUICIDAL THOUGHTS (PAST 1 MONTH): NO

## 2024-07-20 SDOH — HEALTH STABILITY: MENTAL HEALTH: ARE YOU HAVING THOUGHTS OF KILLING YOURSELF RIGHT NOW?: NO

## 2024-07-20 SDOH — HEALTH STABILITY: MENTAL HEALTH: HAVE YOU EVER TRIED TO KILL YOURSELF?: YES

## 2024-07-20 SDOH — HEALTH STABILITY: MENTAL HEALTH: ANXIETY SYMPTOMS: GENERALIZED

## 2024-07-20 SDOH — HEALTH STABILITY: MENTAL HEALTH: WISH TO BE DEAD (PAST 1 MONTH): NO

## 2024-07-20 ASSESSMENT — ENCOUNTER SYMPTOMS
POLYPHAGIA: 0
SEIZURES: 0
DIZZINESS: 0
NAUSEA: 0
CHEST TIGHTNESS: 0
ABDOMINAL PAIN: 0
PHOTOPHOBIA: 0
POLYDIPSIA: 0
DIAPHORESIS: 0
FATIGUE: 0
SPEECH DIFFICULTY: 0
CONSTIPATION: 0
FEVER: 0
DIARRHEA: 0
SHORTNESS OF BREATH: 0
WHEEZING: 0
CHILLS: 0
FLANK PAIN: 0

## 2024-07-20 ASSESSMENT — PAIN SCALES - GENERAL: PAINLEVEL_OUTOF10: 0 - NO PAIN

## 2024-07-20 ASSESSMENT — LIFESTYLE VARIABLES
PRESCIPTION_ABUSE_PAST_12_MONTHS: NO
SUBSTANCE_ABUSE_PAST_12_MONTHS: NO

## 2024-07-20 ASSESSMENT — PAIN - FUNCTIONAL ASSESSMENT: PAIN_FUNCTIONAL_ASSESSMENT: 0-10

## 2024-07-20 NOTE — DISCHARGE SUMMARY
Discharge Diagnosis  Chronic Atypical Chest pain  Chronic auditory hallucinations   Anxiety   Chronic troponemia       Discharge Meds     Your medication list        CHANGE how you take these medications        Instructions Last Dose Given Next Dose Due   candesartan 16 mg tablet  Commonly known as: Atacand  What changed: Another medication with the same name was removed. Continue taking this medication, and follow the directions you see here.                  CONTINUE taking these medications        Instructions Last Dose Given Next Dose Due   acetaminophen 500 mg tablet  Commonly known as: Tylenol           amLODIPine 5 mg tablet  Commonly known as: Norvasc           apixaban 5 mg tablet  Commonly known as: Eliquis           ARIPiprazole lauroxil 882 mg/3.2 mL injection  Commonly known as: Aristada           atorvastatin 80 mg tablet  Commonly known as: Lipitor           benztropine 0.5 mg tablet  Commonly known as: Cogentin           diclofenac sodium 1 % gel  Commonly known as: Voltaren           divalproex 500 mg 24 hr tablet  Commonly known as: Depakote ER           Flomax 0.4 mg 24 hr capsule  Generic drug: tamsulosin           indapamide 1.25 mg tablet  Commonly known as: Lozol           isosorbide mononitrate ER 30 mg 24 hr tablet  Commonly known as: Imdur           melatonin 3 mg tablet           Multivitamin 50 Plus  Generic drug: multivitamin with minerals iron-free           nitroglycerin 0.4 mg SL tablet  Commonly known as: Nitrostat           paliperidone 3 mg 24 hr tablet  Commonly known as: Invega           pantoprazole 20 mg EC tablet  Commonly known as: ProtoNix           polyethylene glycol 17 gram packet  Commonly known as: Glycolax, Miralax           traZODone 100 mg tablet  Commonly known as: [facility] SIDDHARTH Meraz is a 57 year old Male with PMHx schizoaffective disorder, KIMBERLEE, multiple SA, PTSD,  HTN, DLD, PE (on lifelong AC), BPH, migraines, and angina  presenting to [facility] with atypical chest pain. His chest pain is described as palpitations which have resolved. It was triggered by an emotional  response to his down-stairs neighbor playing loud music and banging on his floor.  Patient says he has been dealing with a lot of stress at his home which he attributes to his neighbors and often times he develops chest discomfort because of this. His chest pain went away after a couple of hours. He is aware of his chronic chest pain is associated with anxiety.  His EKG has chronic changes as before. He has chronic troponiemia and currently is chest pain free.      Of note, he has been evaluated multiple times in the last 2-3 years for chest pain and it has been largely felt to be non-cardiac in origin. In September of 2022, he had a normal nuclear stress test and in October 2022, he had a normal CT-coronary  study. Patient has never received a coronary angiogram. He follows with Dr. Almonte from cardiology. He is  compliant with all of his home medications and keeps an updated  list with him at all times. He does not use any recreational drugs, nor does he smoke. All of his prior urine toxicology studies have been negative.     Patient has no chest pain at this time. He is aware his pain is associated with anxiety.  He feels better and is stable for discharge and outpatient follow up      Pertinent Physical Exam At Time of Discharge  Physical Exam  General: Awake, alert, and oriented x3. NAD. Appears stated age. Well-developed and hydrated.   Neuro: CN 3-12 intact. Normal speech and sensation. Motor function is normal   Head: N/C and AT, PERRL, EOMI, moist mucous membranes.  CVS: chest normal in appearance, non-tender to palpation. Normal S1, S2 without additional sounds. No murmurs appreciated.   Lung: CTAB with symmetrical chest rise and normal work of breathing  Abdomen: no visible deformities, non-tender to palpation and without organomegaly.   Extremities: upper and  lower extremities without trauma or deformities, no swelling or erythema, normal capillary refills.   Skin: warm, dry and intact without rashes or lesions.   Psych: Appropriate mood and affect. No evidence of visual or auditory hallucinations.   Outpatient Follow-Up  No future appointments.    >35 minutes spent coordinating the care of the patient    Melody Clemente DO

## 2024-07-20 NOTE — ED TRIAGE NOTES
Pt bib EMS for psych eval.  Pt endorses passive SI but denies HI, A/V H.  States he has been taking his meds without issue.  Pt endorses problems with where he lives and his neighbors being a stressor.  PMHx Schizoaffective, T2DM.  Pt roomed in hallway near [facility] for closer monitoring

## 2024-07-20 NOTE — ED PROVIDER NOTES
CC: Psychiatric Evaluation     History provided by: Patient and EMS  Limitations to History: None    HPI:  Patient is a 58-year-old male with history of polysubstance use disorder, hypertension, migraines, pulmonary embolism on Eliquis, schizoaffective disorder, depression, cerebral palsy, type 2 diabetes who presents for psychiatric evaluation.  Per patient, he states he wanted to come into the hospital to be evaluated by psychiatry because he has been frustrated at home due to recent stressors.  He states he is having thoughts that are all over the place and is having thoughts of hurting himself without a plan.  He states there are children who live in the apartment complex near him who makes a lot of noise and today he snapped.  He denies any assault.  He states he has thoughts of bearing these children but is trying to stop these thoughts.  He has intermittent auditory and visual hallucinations.  He states he is compliant with all of his home medications including antipsychotics and Eliquis.  Denies any chest pain, shortness of breath, illicit drug use, toxic ingestions.    I reviewed patient's medication list from [facility] ED visit on July 16, 2024 which appeared to include Lozol 1.25 milligrams daily, Lipitor 80 mg daily, aripiprazole 3.2 mL IM every 4 weeks, Imdur, Haldol 1 mg, Eliquis 5 mg, Cogentin 0.5 mg, Atacand 16 mg, orlistat 60 mg, pantoprazole, trazodone 100 mg, Depakote 500 mg.    External Records Reviewed:  I reviewed prior ED visits, Care Everywhere, discharge summaries and outpatient records as appropriate.   ???????????????????????????????????????????????????????????????  Triage Vitals:  T 36.7 °C (98.1 °F)  HR 68  /77  RR 16  O2 98 %      Physical Exam  Vitals and nursing note reviewed.   Constitutional:       General: He is not in acute distress.     Appearance: Normal appearance.   HENT:      Head: Normocephalic and atraumatic.   Eyes:      Conjunctiva/sclera: Conjunctivae normal.    Cardiovascular:      Rate and Rhythm: Normal rate and regular rhythm.   Pulmonary:      Effort: Pulmonary effort is normal. No respiratory distress.   Abdominal:      General: Abdomen is flat.      Palpations: Abdomen is soft.   Musculoskeletal:         General: Normal range of motion.      Cervical back: Normal range of motion and neck supple.   Skin:     General: Skin is warm and dry.   Neurological:      General: No focal deficit present.      Mental Status: He is alert and oriented to person, place, and time. Mental status is at baseline.   Psychiatric:         Mood and Affect: Mood is anxious.         Speech: Speech is tangential.         Behavior: Behavior normal. Behavior is cooperative.         Thought Content: Thought content is paranoid.      Comments: Patient does have paranoid thought process, anxious mood, tangential thinking however he is able to be redirectable and is answering questions and commands appropriately, he is calm and cooperative        ???????????????????????????????????????????????????????????????  ED Course/Treatment/Medical Decision Making  MDM:  Patient is a 58-year-old male who presents for psychiatric evaluation.  Vital signs are stable, no signs of trauma on examination.  Patient does have known psychiatric history and is overall compliant with his medications.  He does appear to have stressors at home aggravating his underlying psychiatric illnesses.  He is overall calm and cooperative, answering my questions and commands appropriately but does have paranoid thought process, SI without a plan.  Psychiatric labs obtained and EPAT consulted.      ED Course:  ED Course as of 07/20/24 0651   Sat Jul 20, 2024   0618 Labs reviewed overall wnl [SA]   0618 Home trazodone ordered [SA]   0650 EKG reviewed with sinus bradycardia rate 52, MD interval 150 ms, QRS 80 ms, QTc 377 ms, nonspecific ST segment abnormalities in leads V1, V2, V3 [SA]   0650 Patient is endorsing chest pain therefore  aspirin and troponin ordered.  Patient will need troponin resulted before medical clearance.  Consider repeating EKG to evaluate for dynamic changes as well. [SA]      ED Course User Index  [SA] Irena Bain DO         Diagnoses as of 07/20/24 0651   Encounter for psychiatric assessment         EKG Interpretation:  See ED Course/Below:    Independent Interpretation of Studies:  I independently interpreted labs/imaging as stated in ED Course or below.    Differential diagnoses considered include but are not limited to: See MDM/Below:    Social Determinants Limiting Care:  Mental health disorder The following actions were taken to address these social determinants: EPAT    Discussion of Management with Other Providers: See MDM/Below:    Disposition:  Patient signed out at 0700 pending completion of workup and final disposition    RAGHAV Ruiz, PGY-3    I reviewed the case with the attending ED physician. The attending ED physician agrees with the plan. Patient and/or patient´s representative was counseled regarding labs, imaging, likely diagnosis, and plan. All questions were answered.    Disclaimer: This note was dictated by speech recognition.  Attempt at proofreading was made to minimize errors.  Errors in transcription may be present.  Please call if questions.    Procedures ? [email or web address] last updated 7/20/2024 6:51 AM        Irena Bain DO  Resident  07/20/24 0636       Irena Bain DO  Resident  07/20/24 0651

## 2024-07-20 NOTE — PROGRESS NOTES
EPAT - Social Work Psychiatric Assessment    Arrival Details  Mode of Arrival: Ambulance  Admission Source: Home  Admission Type: Voluntary  EPAT Assessment Start Date: 07/20/24  EPAT Assessment Start Time: 0740  Name of : Deanne Gandhi. LPC    History of Present Illness  Admission Reason: a psychiatric evaluation  HPI: Pt is 58 years old AA male who presented to the ED for a psychiatric evaluation. Pt has a history of schizophrenia and depression. Pt has a history of inpatient psychiatric admissions, with the most recent one at [facility] in 11/2022. Pt is seeing MH provider and a therapist at [facility]. Pt is compliant with medications. Pt’s chart, ED provider, and nursing notes were reviewed prior to the assessment. Pt presented to the ED “because he has been frustrated at home due to recent stressors. He states that he is having thoughts that are all over the place and is having thoughts of hurting himself without a plan. He states there are children who live in the apartment complex near him who make a lot of noise, and today he snapped. He denies any assault.” Pt’s BAL and UDS were negative.    SW Readmission Information   Readmission within 30 Days: Yes  Previous ED Visit Date and Reason : 07/16/24 for severe chest pain  Previous Discharge Date and Location: 07/16/24 [facility]    Psychiatric Symptoms  Anxiety Symptoms: Generalized  Depression Symptoms: No problems reported or observed.  Rekha Symptoms: No problems reported or observed.    Psychosis Symptoms  Hallucination Type: No problems reported or observed.  Delusion Type: No problems reported or observed.    Additional Symptoms - Adult  Generalized Anxiety Disorder: Difficult to control worry, Difficulity concentrating, Excessive anxiety/worry, Irritability  Obsessive Compulsive Disorder: No problems reported or observed.  Panic Attack: No problems reported or observed.  Post Traumatic Stress Disorder: No problems reported or observed.  Delirium:  No problems reported or observed.    Past Psychiatric History/Meds/Treatments  Past Psychiatric History: schizophrenia and depression  Past Psychiatric Meds/Treatments: Pete 11/2022  Past Violence/Victimization History: not assessed    Current Mental Health Contacts   Name/Phone Number: [facility]  Provider Name/Phone Number: [facility]    Support System: Immediate family, Friends, Community, Muslim institution    Living Arrangement: Apartment, Lives alone    Home Safety  Feels Safe Living in Home: Yes    Income Information  Employment Status for: Patient  Employment Status: Disabled  Income Source: Disability    [employer] International Service/Education History  Current or Previous  Service: None  Education Level: Vocational school         Legal  Legal Considerations: Patient/ Family Capacity to Make Sound Judgments  Criminal Activity/ Legal Involvement Pertinent to Current Situation/ Hospitalization: none    Drug Screening  Have you used any substances (canabis, cocaine, heroin, hallucinogens, inhalants, etc.) in the past 12 months?: No  Have you used any prescription drugs other than prescribed in the past 12 months?: No  Is a toxicology screen needed?: Yes         Psychosocial  Psychosocial (WDL): Exceptions to WDL  Behaviors/Mood: Anxious, Appropriate for age, Appropriate for situation, Cooperative  Affect: Appropriate to circumstances    Orientation  Orientation Level: Oriented X4    General Appearance  Motor Activity: Unremarkable  Speech Pattern: Excessively soft  General Attitude: Cooperative  Appearance/Hygiene: Unremarkable    Thought Process  Content: Blaming others  Delusions: Other (Comment) (none)  Perception: Not altered  Hallucination: None  Judgment/Insight: Other (Comment) (fair)  Confusion: None  Cognition: Appropriate judgement, Appropriate safety awareness, Appropriate attention/concentration, Appropriate for developmental age, Follows commands    Sleep Pattern  Sleep  Pattern: Disturbed/interrupted sleep    Risk Factors  Self Harm/Suicidal Ideation Plan: denied  Previous Self Harm/Suicidal Plans: several SA via OD and drinking laundry detergent; the most recent was in 2015, when he “had hypertension and tried to give himself a stroke by drinking salty water.”  Risk Factors: Age < 19 years old    Violence Risk Assessment  Assessment of Violence: None noted  Thoughts of Harm to Others: Yes - currently present  Description of Violence Behavior: has thought of harming neighbor’s children  Homicidal ideation: Yes - currently present  Current Homicidal Intent: No  Current Homicidal Plan: No  Access to Homicidal Means: No  Identified Victim: neighbor’s children  History of Harm to Others: No  Access to Weapons: No  Criminal Charges Pending: No    Ability to Assess Risk Screen  Risk Screen - Ability to Assess: Able to be screened  Ask Suicide-Screening Questions  1. In the past few weeks, have you wished you were dead?: No  2. In the past few weeks, have you felt that you or your family would be better off if you were dead?: No  3. In the past week, have you been having thoughts about killing yourself?: No  4. Have you ever tried to kill yourself?: Yes  How did you try to kill yourself?: several SA via OD and drinking laundry detergent; the most recent was in 2015, when he “had hypertension and tried to give himself a stroke by drinking salty water.”  5. Are you having thoughts of killing yourself right now?: No  Calculated Risk Score: Potential Risk  [place] Suicide Severity Rating Scale (Screener/Recent Self-Report)  1. Wish to be Dead (Past 1 Month): No  2. Non-Specific Active Suicidal Thoughts (Past 1 Month): No  6. Suicidal Behavior (Lifetime): Yes  6. Suicidal Behavior (3 Months): No  6. Suicidal Behavior (Description): several SA via OD and drinking laundry detergent; the most recent was in 2015, when he “had hypertension and tried to give himself a stroke by drinking salty  water.”  Calculated C-SSRS Risk Score (Lifetime/Recent): Moderate Risk  Step 1: Risk Factors  Current & Past Psychiatric Dx: Psychotic disorder, Mood disorder, Recent onset  Presenting Symptoms: Anxiety and/or panic  Precipitants/Stressors: Triggering events leading to humiliation, shame, and/or despair (e.g. loss of relationship, financial or health status) (real or anticipated)  Change in Treatment: Other (Comment) (none)  Access to Lethal Methods : No  Step 2: Protective Factors   Protective Factors Internal: Ability to cope with stress, Frustration tolerance, Buddhist beliefs  Protective Factors External: Cultural, spiritual and/or moral attitudes against suicide, Supportive social network or family or friends, Positive therapeutic relationships  Step 5: Documentation  Risk Level: Low suicide risk    Psychiatric Impression and Plan of Care  Assessment and Plan: Pt is 58 years old AA male with a history of schizophrenia and depression was brought to the ED for a psychiatric evaluation. During the assessment, pt was lying in bed, dressed in hospital attire. Pt was anxious and cooperative. Pt stated that he feels “afraid, anxious, and nervous.” Pt stated that his current stressors are the neighbor’s children, who are “running around, stamping, screaming, and crying.” Pt stated that he has thought of harming them, but he has actually never seen them, only their parents. Pt denied having a plan or intent. Per chart, pt had a similar presentation in the past, when he was upset with his other neighbors and had HI toward them. Pt has never actually hurt anyone and is always reaching out for help when he has HI. Pt is chronically moderate risk on the [place] SSRS due to several SA via OD and drinking laundry detergent; the most recent was in 2015, when he “had hypertension and tried to give himself a stroke by drinking salty water.” Pt denied current SI; thus, acute risk (i.e., the next 72 hours) appears low. Pt was  able to identify a support system: a mother, sister, and friends from the [employer]. Pt was able to identify protective factors: some ability to cope with stress and frustration tolerance, spiritual beliefs, positive therapeutic relationships, and a supportive network of friends and family. Pt was future-oriented: “I hope to be living at a quiet pace.” Pt is working with his CM on getting a new place. He has already applied; it is under review right now. Pt denied access to firearms. Pt denied recent AH/VH. Pt felt safe being discharged home. He was calling his friends to see if he could stay with someone for a couple days while he was waiting for the response from a new place. Pt is help-seeking.         DX: schizophrenia, anxiety        At this time, pt does not meet the criteria for inpatient admission, considering that the pt is not present as an acute risk to self or others. Review with , who is in agreement.    Specific Resources Provided to Patient: pt is connected to outpatient providers  CM Notified: no  PHP/IOP Recommended: none    Outcome/Disposition  Patient's Perception of Outcome Achieved: agreeable  Assessment, Recommendations and Risk Level Reviewed with: DR. Bobby  Contact Name: Donte Greene  Contact Number(s): [phone]  Contact Relationship: other  EPAT Assessment Completed Date: 07/20/24  EPAT Assessment Completed Time: 0815  Patient Disposition: Home    Social Work Note

## 2024-07-20 NOTE — H&P
History Of Present Illness  Eleazar Burrell is a 58 y.o. male presenting with chronic atypical chest pain associated with anxiety     ELEAZAR BURRELL is a 57 year old Male with PMHx schizoaffective disorder, KIMBERLEE, multiple SA, PTSD,  HTN, DLD, PE (on lifelong AC), BPH, migraines, and angina presenting to [facility] with atypical chest pain. His chest pain is described as palpitations which have resolved. It was triggered by an emotional  response to his down-stairs neighbor playing loud music and banging on his floor.  Patient says he has been dealing with a lot of stress at his home which he attributes to his neighbors and often times he develops chest discomfort because of this. His chest pain went away after a couple of hours. He is aware of his chronic chest pain is associated with anxiety.  His EKG has chronic changes as before. He has chronic troponiemia and currently is chest pain free.     Of note, he has been evaluated multiple times in the last 2-3 years for chest pain and it has been largely felt to be non-cardiac in origin. In September of 2022, he had a normal nuclear stress test and in October 2022, he had a normal CT-coronary  study. Patient has never received a coronary angiogram. He follows with Dr. Almonte from cardiology. He is  compliant with all of his home medications and keeps an updated  list with him at all times. He does not use any recreational drugs, nor does he smoke. All of his prior urine toxicology studies have been negative.     Past Medical History  Past Medical History:   Diagnosis Date    Depression     Diabetes mellitus (Multi)     Glaucoma     Hypertension     Other conditions influencing health status 07/21/2013    Glaucoma / Intraocular Pressure    PE (pulmonary thromboembolism) (Multi)     Schizoaffective disorder (Multi)        Surgical History  Past Surgical History:   Procedure Laterality Date    CT ANGIO CORONARY ART WITH HEARTFLOW IF SCORE >30%  10/07/2022    CT HEART  CORONARY ANGIOGRAM 10/7/2022 [facility]Y [facility]    EYE SURGERY Bilateral         Social History  He reports that he has never smoked. He has never been exposed to tobacco smoke. He has never used smokeless tobacco. He reports that he does not drink alcohol and does not use drugs.    Family History  Family History   Problem Relation Name Age of Onset    Hypertension Mother      Cataracts Father      Depression Father      Asthma Sister      Seizures Sister          Allergies  Lisinopril, Copper, and Quinapril    Review of Systems   Constitutional:  Negative for chills, diaphoresis, fatigue and fever.   HENT:  Negative for hearing loss.    Eyes:  Negative for photophobia.   Respiratory:  Negative for chest tightness, shortness of breath and wheezing.    Cardiovascular:  Negative for chest pain.   Gastrointestinal:  Negative for abdominal pain, constipation, diarrhea and nausea.   Endocrine: Negative for cold intolerance, heat intolerance, polydipsia and polyphagia.   Genitourinary:  Negative for flank pain and urgency.   Neurological:  Negative for dizziness, seizures and speech difficulty.        Physical Exam  General: Awake, alert, and oriented x3. NAD. Appears stated age. Well-developed and hydrated.   Neuro: CN 3-12 intact. Normal speech and sensation. Motor function is normal   Head: N/C and AT, PERRL, EOMI, moist mucous membranes.  CVS: chest normal in appearance, non-tender to palpation. Normal S1, S2 without additional sounds. No murmurs appreciated.   Lung: CTAB with symmetrical chest rise and normal work of breathing  Abdomen: no visible deformities, non-tender to palpation and without organomegaly.   Extremities: upper and lower extremities without trauma or deformities, no swelling or erythema, normal capillary refills.   Skin: warm, dry and intact without rashes or lesions.   Psych: Appropriate mood and affect. No evidence of visual or auditory hallucinations.   Last Recorded Vitals  Blood pressure  134/77, pulse 68, temperature 36.7 °C (98.1 °F), temperature source Temporal, resp. rate 16, SpO2 98%.    Relevant Results  Scheduled medications  amLODIPine, 5 mg, oral, Daily  apixaban, 5 mg, oral, BID  [START ON 7/21/2024] ARIPiprazole lauroxil, 882 mg, intramuscular, q28 days  atorvastatin, 80 mg, oral, Daily  benztropine, 0.5 mg, oral, Nightly  divalproex, 1,000 mg, oral, Nightly  indapamide, 1.25 mg, oral, Daily  isosorbide mononitrate ER, 30 mg, oral, Daily  multivitamin with minerals iron-free, 1 tablet, oral, Daily  pantoprazole, 40 mg, oral, Daily  polyethylene glycol, 17 g, oral, Daily  risperiDONE, 0.5 mg, oral, BID  sennosides-docusate sodium, 2 tablet, oral, BID  tamsulosin, 0.4 mg, oral, Daily  traZODone, 100 mg, oral, Nightly  valsartan, 160 mg, oral, Daily      Continuous medications     PRN medications  PRN medications: acetaminophen, diclofenac sodium, melatonin, nitroglycerin, polyethylene glycol      Assessment/Plan   56 y/o M with PMHx schizoaffective disorder, KIMBERLEE, multiple SA, PTSD, HTN, DLD, PE (on lifelong AC), BPH, migraines, presented for chronic auditory hallucinations and anxiety and was evaluated by EPAT and given clearance.  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  EKG without new ischemic findings, chronic troponin better than prior admissions         2. normal nuclear stress (9/2022) and normal CT-coronary angio 10/2022        3. patient is unlikely to benefit from any additional ischemic workup         4. continue home Imdur 30 and Atorvastatin 80      2. hx of PE x2 (on lifelong AC)      1. continue Eliquis 5 BID     3. Schizoaffective disorder/Migraines/KIMBERLEE      1. continue Divalproex, trazodone, benztropine      2. also on long-acting antipsychotic (Aristada Q4 weeks; last dose a few days a go)     4. BPH       1.  continue Tamsulosin      Disposition: patient symptoms resolved and associated with anxiety. Patient will be discharge.     I spent 45 minutes in the professional and overall care of this patient.      Melody Clemente, DO

## 2024-07-20 NOTE — PROGRESS NOTES
Emergency Department Transition of Care Note       Signout   I received Eleazar Burrell in signout from Dr. Bain.  Please see the ED Provider Note for all HPI, PE and MDM up to the time of signout at 7 AM.  This is in addition to the primary record.    In brief Eleazar Burrell is an 58 y.o. male presenting for psychiatric evaluation.  Patient also was complaining about chest discomfort.  Patient has T wave inversions in V5.  Troponin was pending at the time of signout    At the time of signout we were awaiting:  Troponin and EPAT evaluate    ED Course & Medical Decision Making   Medical Decision Making:  Under my care, patient was evaluated by EPAT in the ED who cleared the patient for discharge from a psychiatric standpoint. EPAT did not feel that the patient meets criteria for inpatient psychiatric admission.  However patient's troponin was elevated to 62 and up trended to 67.  Patient did receive aspirin. Patient denies any chest pain on repeat evaluation. He remains stable.  Patient will require admission for cardiac risk stratification and further management and monitoring.     ED Course:  ED Course as of 07/20/24 0941   Sat Jul 20, 2024   0618 Labs reviewed overall wnl [SA]   0618 Home trazodone ordered [SA]   0650 EKG reviewed with sinus bradycardia rate 52, OH interval 150 ms, QRS 80 ms, QTc 377 ms, nonspecific ST segment abnormalities in leads V1, V2, V3 [SA]   0650 Patient is endorsing chest pain therefore aspirin and troponin ordered.  Patient will need troponin resulted before medical clearance.  Consider repeating EKG to evaluate for dynamic changes as well. [SA]   0807 Troponin I, High Sensitivity (CMC)(!): 62 [CRISTAL]      ED Course User Index  [CRISTAL] Natali Bobby MD  [SA] Irena Bain,          Diagnoses as of 07/20/24 0941   Encounter for psychiatric assessment   Chest pain, unspecified type       Disposition   As a result of their workup, the patient will require admission to the  [facility].  The patient was informed of his diagnosis.  The patient was given the opportunity to ask questions and I answered them. The patient agreed to be admitted to the hospital.    Procedures   Procedures    Patient seen and discussed with the ED physician.     Natali Bobby MD  Emergency Medicine

## 2024-07-31 ENCOUNTER — CLINICAL SUPPORT (OUTPATIENT)
Dept: EMERGENCY MEDICINE | Facility: HOSPITAL | Age: 59
End: 2024-07-31
Payer: MEDICAID

## 2024-07-31 ENCOUNTER — HOSPITAL ENCOUNTER (EMERGENCY)
Facility: HOSPITAL | Age: 59
Discharge: HOME | End: 2024-07-31
Attending: STUDENT IN AN ORGANIZED HEALTH CARE EDUCATION/TRAINING PROGRAM
Payer: MEDICAID

## 2024-07-31 VITALS
DIASTOLIC BLOOD PRESSURE: 70 MMHG | RESPIRATION RATE: 16 BRPM | HEIGHT: 68 IN | OXYGEN SATURATION: 97 % | BODY MASS INDEX: 27.28 KG/M2 | WEIGHT: 180 LBS | SYSTOLIC BLOOD PRESSURE: 104 MMHG | TEMPERATURE: 97.7 F | HEART RATE: 45 BPM

## 2024-07-31 DIAGNOSIS — R19.5 DARK STOOLS: Primary | ICD-10-CM

## 2024-07-31 LAB
ABO GROUP (TYPE) IN BLOOD: NORMAL
ALBUMIN SERPL BCP-MCNC: 3.7 G/DL (ref 3.4–5)
ALP SERPL-CCNC: 61 U/L (ref 33–136)
ALT SERPL W P-5'-P-CCNC: 12 U/L (ref 10–52)
ANION GAP SERPL CALC-SCNC: 13 MMOL/L (ref 10–20)
ANTIBODY SCREEN: NORMAL
APTT PPP: 35 SECONDS (ref 27–38)
AST SERPL W P-5'-P-CCNC: 20 U/L (ref 9–39)
ATRIAL RATE: 48 BPM
BASOPHILS # BLD AUTO: 0.02 X10*3/UL (ref 0–0.1)
BASOPHILS NFR BLD AUTO: 0.4 %
BILIRUB SERPL-MCNC: 0.5 MG/DL (ref 0–1.2)
BUN SERPL-MCNC: 17 MG/DL (ref 6–23)
CALCIUM SERPL-MCNC: 9.1 MG/DL (ref 8.6–10.6)
CHLORIDE SERPL-SCNC: 105 MMOL/L (ref 98–107)
CO2 SERPL-SCNC: 27 MMOL/L (ref 21–32)
CREAT SERPL-MCNC: 1.1 MG/DL (ref 0.5–1.3)
EGFRCR SERPLBLD CKD-EPI 2021: 77 ML/MIN/1.73M*2
EOSINOPHIL # BLD AUTO: 0.07 X10*3/UL (ref 0–0.7)
EOSINOPHIL NFR BLD AUTO: 1.3 %
ERYTHROCYTE [DISTWIDTH] IN BLOOD BY AUTOMATED COUNT: 13.2 % (ref 11.5–14.5)
GLUCOSE SERPL-MCNC: 79 MG/DL (ref 74–99)
HCT VFR BLD AUTO: 39.4 % (ref 41–52)
HGB BLD-MCNC: 13.4 G/DL (ref 13.5–17.5)
IMM GRANULOCYTES # BLD AUTO: 0 X10*3/UL (ref 0–0.7)
IMM GRANULOCYTES NFR BLD AUTO: 0 % (ref 0–0.9)
INR PPP: 1 (ref 0.9–1.1)
LYMPHOCYTES # BLD AUTO: 3.01 X10*3/UL (ref 1.2–4.8)
LYMPHOCYTES NFR BLD AUTO: 56.8 %
MCH RBC QN AUTO: 30 PG (ref 26–34)
MCHC RBC AUTO-ENTMCNC: 34 G/DL (ref 32–36)
MCV RBC AUTO: 88 FL (ref 80–100)
MONOCYTES # BLD AUTO: 0.46 X10*3/UL (ref 0.1–1)
MONOCYTES NFR BLD AUTO: 8.7 %
NEUTROPHILS # BLD AUTO: 1.74 X10*3/UL (ref 1.2–7.7)
NEUTROPHILS NFR BLD AUTO: 32.8 %
NRBC BLD-RTO: 0 /100 WBCS (ref 0–0)
P AXIS: 29 DEGREES
P OFFSET: 201 MS
P ONSET: 151 MS
PLATELET # BLD AUTO: 160 X10*3/UL (ref 150–450)
POTASSIUM SERPL-SCNC: 4 MMOL/L (ref 3.5–5.3)
PR INTERVAL: 154 MS
PROT SERPL-MCNC: 7.1 G/DL (ref 6.4–8.2)
PROTHROMBIN TIME: 11.8 SECONDS (ref 9.8–12.8)
Q ONSET: 228 MS
QRS COUNT: 8 BEATS
QRS DURATION: 102 MS
QT INTERVAL: 454 MS
QTC CALCULATION(BAZETT): 405 MS
QTC FREDERICIA: 421 MS
R AXIS: -5 DEGREES
RBC # BLD AUTO: 4.47 X10*6/UL (ref 4.5–5.9)
RH FACTOR (ANTIGEN D): NORMAL
SODIUM SERPL-SCNC: 141 MMOL/L (ref 136–145)
T AXIS: -63 DEGREES
T OFFSET: 455 MS
VENTRICULAR RATE: 48 BPM
WBC # BLD AUTO: 5.3 X10*3/UL (ref 4.4–11.3)

## 2024-07-31 PROCEDURE — 99284 EMERGENCY DEPT VISIT MOD MDM: CPT | Mod: 25

## 2024-07-31 PROCEDURE — 85025 COMPLETE CBC W/AUTO DIFF WBC: CPT

## 2024-07-31 PROCEDURE — 96374 THER/PROPH/DIAG INJ IV PUSH: CPT

## 2024-07-31 PROCEDURE — 36415 COLL VENOUS BLD VENIPUNCTURE: CPT

## 2024-07-31 PROCEDURE — 99285 EMERGENCY DEPT VISIT HI MDM: CPT | Performed by: STUDENT IN AN ORGANIZED HEALTH CARE EDUCATION/TRAINING PROGRAM

## 2024-07-31 PROCEDURE — 85610 PROTHROMBIN TIME: CPT

## 2024-07-31 PROCEDURE — 93005 ELECTROCARDIOGRAM TRACING: CPT

## 2024-07-31 PROCEDURE — 86901 BLOOD TYPING SEROLOGIC RH(D): CPT

## 2024-07-31 PROCEDURE — 2500000004 HC RX 250 GENERAL PHARMACY W/ HCPCS (ALT 636 FOR OP/ED): Mod: SE

## 2024-07-31 PROCEDURE — 84075 ASSAY ALKALINE PHOSPHATASE: CPT

## 2024-07-31 PROCEDURE — C9113 INJ PANTOPRAZOLE SODIUM, VIA: HCPCS | Mod: SE

## 2024-07-31 RX ORDER — PANTOPRAZOLE SODIUM 40 MG/10ML
80 INJECTION, POWDER, LYOPHILIZED, FOR SOLUTION INTRAVENOUS ONCE
Status: COMPLETED | OUTPATIENT
Start: 2024-07-31 | End: 2024-07-31

## 2024-07-31 RX ORDER — PANTOPRAZOLE SODIUM 20 MG/1
40 TABLET, DELAYED RELEASE ORAL DAILY
Qty: 40 TABLET | Refills: 0 | Status: SHIPPED | OUTPATIENT
Start: 2024-07-31 | End: 2024-08-20

## 2024-07-31 RX ADMIN — PANTOPRAZOLE SODIUM 80 MG: 40 INJECTION, POWDER, FOR SOLUTION INTRAVENOUS at 06:23

## 2024-07-31 ASSESSMENT — LIFESTYLE VARIABLES
HAVE YOU EVER FELT YOU SHOULD CUT DOWN ON YOUR DRINKING: NO
TOTAL SCORE: 0
EVER HAD A DRINK FIRST THING IN THE MORNING TO STEADY YOUR NERVES TO GET RID OF A HANGOVER: NO
HAVE PEOPLE ANNOYED YOU BY CRITICIZING YOUR DRINKING: NO
EVER FELT BAD OR GUILTY ABOUT YOUR DRINKING: NO

## 2024-07-31 ASSESSMENT — PAIN SCALES - GENERAL: PAINLEVEL_OUTOF10: 0 - NO PAIN

## 2024-07-31 ASSESSMENT — COLUMBIA-SUICIDE SEVERITY RATING SCALE - C-SSRS
1. IN THE PAST MONTH, HAVE YOU WISHED YOU WERE DEAD OR WISHED YOU COULD GO TO SLEEP AND NOT WAKE UP?: NO
6. HAVE YOU EVER DONE ANYTHING, STARTED TO DO ANYTHING, OR PREPARED TO DO ANYTHING TO END YOUR LIFE?: NO
2. HAVE YOU ACTUALLY HAD ANY THOUGHTS OF KILLING YOURSELF?: NO

## 2024-07-31 ASSESSMENT — PAIN - FUNCTIONAL ASSESSMENT: PAIN_FUNCTIONAL_ASSESSMENT: 0-10

## 2024-07-31 NOTE — ED PROVIDER NOTES
CC: Black or Bloody Stool     HPI:  Patient is a 58-year-old male with a past medical history of hemorrhoids schizoaffective disorder, KIMBERLEE, PTSD, HTN, DLD, PE on Eliquis, BPH, and migraines who presented to the ED for dark stool.  Patient noted 1 episode of dark stool today before ED presentation.  Patient denies any previous history of dark stools.  Patient notes his history of hemorrhoids and discontinues his Preparation H within the past couple weeks.  Denied ibuprofen and alcohol use.  Denied history of GI bleeds.  Denied hemoptysis or hematemesis.  Patient notes that he will occasionally feel dizzy and off balance likely secondary to other individuals in his feeling smoking cannabis.  Of note, patient underwent a colonoscopy on 1/16/2024 significant for a single polyp.  Denies fevers, chills, chest pain, difficulty breathing, abdominal pain, neck pain, back pain, significant changes in weight, night sweats, dysuria, and diarrhea.    Limitations to history: None  Independent historian(s): Patient  Records Reviewed: Recent available ED and inpatient notes reviewed in EMR.    PMHx/PSHx:  Per HPI.   - has a past medical history of Depression, Diabetes mellitus (Multi), Glaucoma, Hypertension, Other conditions influencing health status (07/21/2013), PE (pulmonary thromboembolism) (Multi), and Schizoaffective disorder (Multi).  - has a past surgical history that includes CT angio coronary art with heartflow if score >30% (10/07/2022) and Eye surgery (Bilateral).    Medications:  Reviewed in EMR. See EMR for complete list of medications and doses.    Allergies:  Lisinopril, Copper, and Quinapril    Social History:  - Tobacco:  reports that he has never smoked. He has never been exposed to tobacco smoke. He has never used smokeless tobacco.   - Alcohol:  reports no history of alcohol use.   - Illicit Drugs:  reports no history of drug use.     ROS:  Per HPI.        ???????????????????????????????????????????????????????????????  Triage Vitals:  T 36.5 °C (97.7 °F)  HR 51  /72  RR 18  O2 97 %      Physical Exam  Vitals and nursing note reviewed.   Constitutional:       General: He is not in acute distress.  HENT:      Head: Normocephalic and atraumatic.      Nose: Nose normal.      Mouth/Throat:      Mouth: Mucous membranes are moist.   Eyes:      Conjunctiva/sclera: Conjunctivae normal.   Cardiovascular:      Rate and Rhythm: Normal rate and regular rhythm.      Pulses: Normal pulses.   Pulmonary:      Effort: Pulmonary effort is normal. No respiratory distress.      Breath sounds: Normal breath sounds.   Abdominal:      Palpations: Abdomen is soft.      Tenderness: There is no abdominal tenderness.   Genitourinary:     Comments: CARLITO did not display hemorrhoid, masses, bright red blood per rectum, or melena.  Patient noted to have yellow stool on CARLITO.  CARLITO performed with Negra George RN  Skin:     General: Skin is warm.   Neurological:      General: No focal deficit present.      Mental Status: He is alert.           ???????????????????????????????????????????????????????????????  Labs:   Labs Reviewed - No data to display     Imaging:   No orders to display        EKG:  Rate is 48, sinus rhythm, normal axis, no interval prolongation, no st elevation or depression.  When compared to EKG on 7/20/2024 review of EKG does not show any signs of STEMI, complete heart block, asystole, V-fib.    MDM:  Patient is a 58-year-old male with a past medical history of hemorrhoids schizoaffective disorder, KIMBERLEE, PTSD, HTN, DLD, PE on Eliquis, BPH, and migraines who presented to the ED for dark stool.  Patient noted to be bradycardic with remainder vitals WNL.  Patient has a history of bradycardia.  Physical exam finding significant for yellow stool on CARLITO.  Low clinical concern for hemorrhage or hemorrhagic shock, mesenteric ischemia, SBO, traumatic process, infectious process,  malignancy, neurologic process, and vascular process.  Basic labs, coags, and type and screen ordered.  No indication for imaging at this time.  Metabolic panel was unremarkable including normal BUN.  CBC significant for no leukocytosis and mild anemia with hemoglobin 13.4.  Patient administered IV pantoprazole.  Low clinical concern for upper GI bleed at this time.  Patient's dark stool may have been secondary to what he ate. Patient does not have findings concerning for hemorrhage.  Patient prescribed pantoprazole.  Discussed ED findings, plan for outpatient primary care follow-up in the next week, GI follow-up as needed, and strict return precautions for any new or worsening symptoms.  Patient stated understanding and agreement with plan.  All questions answered.  Patient discharged in stable condition.    ED Course:  Diagnoses as of 08/01/24 1658   Dark stools       Social Determinants Limiting Care:  None identified    Disposition:  Discharge    Martin Gifford MD   Emergency Medicine PGY-3  [facility]    Comment: Please note this report has been produced using speech recognition software and may contain errors related to that system including errors in grammar, punctuation, and spelling as well as words and phrases that may be inappropriate.  If there are any questions or concerns please feel free to contact the dictating provider for clarification.    Procedures ? [employer] last updated 7/31/2024 4:52 AM        Martin Gifford MD  Resident  08/01/24 [id number]

## 2024-07-31 NOTE — ED TRIAGE NOTES
Pt BIB EMS from home. Pt reports he noticed dark stool that started yesterday. Pt reports he has been having leaking bowels for some time and was talking to his PCP about a colonoscopy. Pt reports feeling dizzy and off balance for the past few weeks. Pt has a hx of anxiety and states he feels anxiety in his stomach. VSS A&Ox4 at this time.

## 2024-07-31 NOTE — DISCHARGE INSTRUCTIONS
You presented to the ED for dark stools.  You are noted to have light brown stool on rectal exam.  Your lab work was noted be unremarkable.  You received pantoprazole for prevention of GI bleeding.  You are prescribed pantoprazole.  Please follow-up with GI as needed.  Please follow-up with primary care within the next week.  The phone number for the primary care clinic at [facility] is [phone].  Please return to the emergency department for any new or worsening symptoms including but not limited to bright red stools, dark tarry stools, and abdominal pain.

## 2024-08-08 ENCOUNTER — HOSPITAL ENCOUNTER (EMERGENCY)
Facility: HOSPITAL | Age: 59
Discharge: HOME | End: 2024-08-08
Payer: MEDICARE

## 2024-08-08 ENCOUNTER — APPOINTMENT (OUTPATIENT)
Dept: RADIOLOGY | Facility: HOSPITAL | Age: 59
End: 2024-08-08
Payer: MEDICARE

## 2024-08-08 VITALS
SYSTOLIC BLOOD PRESSURE: 133 MMHG | HEART RATE: 60 BPM | BODY MASS INDEX: 27.28 KG/M2 | HEIGHT: 68 IN | RESPIRATION RATE: 16 BRPM | OXYGEN SATURATION: 98 % | DIASTOLIC BLOOD PRESSURE: 85 MMHG | TEMPERATURE: 97.2 F | WEIGHT: 180 LBS

## 2024-08-08 DIAGNOSIS — J06.9 UPPER RESPIRATORY TRACT INFECTION, UNSPECIFIED TYPE: Primary | ICD-10-CM

## 2024-08-08 LAB — SARS-COV-2 RNA RESP QL NAA+PROBE: NOT DETECTED

## 2024-08-08 PROCEDURE — 71046 X-RAY EXAM CHEST 2 VIEWS: CPT | Performed by: RADIOLOGY

## 2024-08-08 PROCEDURE — 87635 SARS-COV-2 COVID-19 AMP PRB: CPT | Performed by: NURSE PRACTITIONER

## 2024-08-08 PROCEDURE — 99285 EMERGENCY DEPT VISIT HI MDM: CPT | Mod: 25

## 2024-08-08 PROCEDURE — 99284 EMERGENCY DEPT VISIT MOD MDM: CPT | Performed by: NURSE PRACTITIONER

## 2024-08-08 PROCEDURE — 99283 EMERGENCY DEPT VISIT LOW MDM: CPT

## 2024-08-08 PROCEDURE — 71046 X-RAY EXAM CHEST 2 VIEWS: CPT

## 2024-08-08 RX ORDER — ALBUTEROL SULFATE 90 UG/1
2 INHALANT RESPIRATORY (INHALATION) EVERY 4 HOURS PRN
Qty: 3.5 G | Refills: 0 | Status: SHIPPED | OUTPATIENT
Start: 2024-08-08

## 2024-08-08 RX ORDER — GUAIFENESIN AND PSEUDOEPHEDRINE HCL 1200; 120 MG/1; MG/1
1 TABLET, EXTENDED RELEASE ORAL 2 TIMES DAILY
Qty: 20 TABLET | Refills: 0 | Status: SHIPPED | OUTPATIENT
Start: 2024-08-08

## 2024-08-08 ASSESSMENT — PAIN SCALES - GENERAL: PAINLEVEL_OUTOF10: 0 - NO PAIN

## 2024-08-08 ASSESSMENT — PAIN - FUNCTIONAL ASSESSMENT: PAIN_FUNCTIONAL_ASSESSMENT: 0-10

## 2024-08-08 NOTE — ED PROVIDER NOTES
Chief Complaint   Patient presents with   • Cough       HPI       58 year old male presents to the Emergency Department today complaining of nasal congestion, postnasal drip, and a nonproductive cough since last evening. Denies any associated fever, chills, headache, neck pain, chest pain, shortness of breath, abdominal pain, nausea, vomiting, diarrhea, constipation, or urinary symptoms. No known sick contacts.       History provided by:  Patient             Patient History   Past Medical History:   Diagnosis Date   • Depression    • Diabetes mellitus (Multi)    • Glaucoma    • Hypertension    • Other conditions influencing health status 07/21/2013    Glaucoma / Intraocular Pressure   • PE (pulmonary thromboembolism) (Multi)    • Schizoaffective disorder (Multi)      Past Surgical History:   Procedure Laterality Date   • CT ANGIO CORONARY ART WITH HEARTFLOW IF SCORE >30%  10/07/2022    CT HEART CORONARY ANGIOGRAM 10/7/2022 [facility] EMERGENCY LEGACY   • EYE SURGERY Bilateral      Family History   Problem Relation Name Age of Onset   • Hypertension Mother     • Cataracts Father     • Depression Father     • Asthma Sister     • Seizures Sister       Social History     Tobacco Use   • Smoking status: Never     Passive exposure: Never   • Smokeless tobacco: Never   Vaping Use   • Vaping status: Never Used   Substance Use Topics   • Alcohol use: Never   • Drug use: Never           Physical Exam  Constitutional:       Appearance: Normal appearance.   HENT:      Head: Normocephalic.      Right Ear: External ear normal.      Left Ear: External ear normal.      Nose: Nose normal.      Mouth/Throat:      Lips: Pink.      Mouth: Mucous membranes are moist.      Dentition: No dental caries.      Pharynx: Oropharynx is clear. Uvula midline. No oropharyngeal exudate or posterior oropharyngeal erythema.      Tonsils: No tonsillar exudate. 1+ on the right. 1+ on the left.   Eyes:      Conjunctiva/sclera: Conjunctivae normal.      Pupils:  Pupils are equal, round, and reactive to light.   Cardiovascular:      Rate and Rhythm: Normal rate and regular rhythm.      Heart sounds: No murmur heard.     No friction rub. No gallop.   Pulmonary:      Effort: Pulmonary effort is normal. No respiratory distress.      Breath sounds: Normal breath sounds. No stridor. No wheezing, rhonchi or rales.   Neurological:      Mental Status: He is alert.         Labs Reviewed   SARS-COV-2 PCR - Normal       Result Value    Coronavirus 2019, PCR Not Detected      Narrative:     This assay has received FDA Emergency Use Authorization (EUA) and is only authorized for the duration of time that circumstances exist to justify the authorization of the emergency use of in vitro diagnostic tests for the detection of SARS-CoV-2 virus and/or diagnosis of COVID-19 infection under section 564(b)(1) of the Act, 21 U.S.C. 360bbb-3(b)(1). This assay is an in vitro diagnostic nucleic acid amplification test for the qualitative detection of SARS-CoV-2 from nasopharyngeal specimens and has been validated for use at [facility]. Negative results do not preclude COVID-19 infections and should not be used as the sole basis for diagnosis, treatment, or other management decisions.         XR chest 2 views                  ED Course & MDM   Diagnoses as of 08/08/24 0843   Upper respiratory tract infection, unspecified type           Medical Decision Making  Patient was seen and evaluated by myself. COVID was negative. CXR showed no acute pathology. At this time, I feel that they have an acute viral URI. Therefore, antibiotics are not clinically indicated. Given prescriptions for Mucinex plus D and an Albuterol inhaler. Follow up with their doctor in 3 days. Return if worse in any way. Discharged in stable condition with computer instructions.    Diagnostic Impression:     1. Acute viral URI    2. Prescription therapy            Your medication list        ASK your doctor  about these medications        Instructions Last Dose Given Next Dose Due   acetaminophen 500 mg tablet  Commonly known as: Tylenol           amLODIPine 5 mg tablet  Commonly known as: Norvasc           apixaban 5 mg tablet  Commonly known as: Eliquis           ARIPiprazole lauroxil 882 mg/3.2 mL injection  Commonly known as: Aristada           atorvastatin 80 mg tablet  Commonly known as: Lipitor           benztropine 0.5 mg tablet  Commonly known as: Cogentin           candesartan 16 mg tablet  Commonly known as: Atacand           diclofenac sodium 1 % gel  Commonly known as: Voltaren           divalproex 500 mg 24 hr tablet  Commonly known as: Depakote ER           Flomax 0.4 mg 24 hr capsule  Generic drug: tamsulosin           indapamide 1.25 mg tablet  Commonly known as: Lozol           isosorbide mononitrate ER 30 mg 24 hr tablet  Commonly known as: Imdur           melatonin 3 mg tablet           Multivitamin 50 Plus  Generic drug: multivitamin with minerals iron-free           nitroglycerin 0.4 mg SL tablet  Commonly known as: Nitrostat           paliperidone 3 mg 24 hr tablet  Commonly known as: Invega           pantoprazole 20 mg EC tablet  Commonly known as: ProtoNix           pantoprazole 20 mg EC tablet  Commonly known as: ProtoNix      Take 2 tablets (40 mg) by mouth once daily for 20 days. Do not crush, chew, or split.       polyethylene glycol 17 gram packet  Commonly known as: Glycolax, Miralax           traZODone 100 mg tablet  Commonly known as: Desyrel                      Procedure  Procedures     ADAM Rodriguez-DELLA  08/08/24 4034

## 2024-08-08 NOTE — ED TRIAGE NOTES
Pt states that he was wearing a face mask earlier today, he went to sleep and woke up with a cough. Pt states that he would like to be checked for covid.

## 2024-10-01 ENCOUNTER — APPOINTMENT (OUTPATIENT)
Dept: PRIMARY CARE | Facility: HOSPITAL | Age: 59
End: 2024-10-01
Payer: MEDICAID

## 2025-01-11 ENCOUNTER — APPOINTMENT (OUTPATIENT)
Dept: RADIOLOGY | Facility: HOSPITAL | Age: 60
End: 2025-01-11
Payer: MEDICARE

## 2025-01-11 ENCOUNTER — CLINICAL SUPPORT (OUTPATIENT)
Dept: EMERGENCY MEDICINE | Facility: HOSPITAL | Age: 60
End: 2025-01-11
Payer: MEDICARE

## 2025-01-11 ENCOUNTER — HOSPITAL ENCOUNTER (EMERGENCY)
Facility: HOSPITAL | Age: 60
Discharge: HOME | End: 2025-01-11
Attending: EMERGENCY MEDICINE
Payer: MEDICARE

## 2025-01-11 VITALS
RESPIRATION RATE: 16 BRPM | BODY MASS INDEX: 27.28 KG/M2 | SYSTOLIC BLOOD PRESSURE: 110 MMHG | HEIGHT: 68 IN | DIASTOLIC BLOOD PRESSURE: 71 MMHG | OXYGEN SATURATION: 98 % | HEART RATE: 50 BPM | TEMPERATURE: 97.4 F | WEIGHT: 180 LBS

## 2025-01-11 DIAGNOSIS — R07.9 CHEST PAIN, UNSPECIFIED TYPE: Primary | ICD-10-CM

## 2025-01-11 LAB
ALBUMIN SERPL BCP-MCNC: 3.9 G/DL (ref 3.4–5)
ALP SERPL-CCNC: 55 U/L (ref 33–136)
ALT SERPL W P-5'-P-CCNC: 14 U/L (ref 10–52)
ANION GAP BLDV CALCULATED.4IONS-SCNC: 4 MMOL/L (ref 10–25)
ANION GAP SERPL CALC-SCNC: 13 MMOL/L (ref 10–20)
AST SERPL W P-5'-P-CCNC: 19 U/L (ref 9–39)
ATRIAL RATE: 49 BPM
BASE EXCESS BLDV CALC-SCNC: 7.1 MMOL/L (ref -2–3)
BASOPHILS # BLD AUTO: 0.03 X10*3/UL (ref 0–0.1)
BASOPHILS NFR BLD AUTO: 0.5 %
BILIRUB SERPL-MCNC: 0.6 MG/DL (ref 0–1.2)
BODY TEMPERATURE: 37 DEGREES CELSIUS
BUN SERPL-MCNC: 23 MG/DL (ref 6–23)
CA-I BLDV-SCNC: 1.23 MMOL/L (ref 1.1–1.33)
CALCIUM SERPL-MCNC: 9.4 MG/DL (ref 8.6–10.6)
CARDIAC TROPONIN I PNL SERPL HS: 52 NG/L (ref 0–53)
CHLORIDE BLDV-SCNC: 106 MMOL/L (ref 98–107)
CHLORIDE SERPL-SCNC: 103 MMOL/L (ref 98–107)
CO2 SERPL-SCNC: 29 MMOL/L (ref 21–32)
CREAT SERPL-MCNC: 1.14 MG/DL (ref 0.5–1.3)
D DIMER PPP FEU-MCNC: <215 NG/ML FEU
EGFRCR SERPLBLD CKD-EPI 2021: 73 ML/MIN/1.73M*2
EOSINOPHIL # BLD AUTO: 0.05 X10*3/UL (ref 0–0.7)
EOSINOPHIL NFR BLD AUTO: 0.9 %
ERYTHROCYTE [DISTWIDTH] IN BLOOD BY AUTOMATED COUNT: 13.6 % (ref 11.5–14.5)
FLUAV RNA RESP QL NAA+PROBE: NOT DETECTED
FLUBV RNA RESP QL NAA+PROBE: NOT DETECTED
GLUCOSE BLDV-MCNC: 86 MG/DL (ref 74–99)
GLUCOSE SERPL-MCNC: 76 MG/DL (ref 74–99)
HCO3 BLDV-SCNC: 32 MMOL/L (ref 22–26)
HCT VFR BLD AUTO: 39 % (ref 41–52)
HCT VFR BLD EST: 44 % (ref 41–52)
HGB BLD-MCNC: 13.9 G/DL (ref 13.5–17.5)
HGB BLDV-MCNC: 14.7 G/DL (ref 13.5–17.5)
HOLD SPECIMEN: NORMAL
HOLD SPECIMEN: NORMAL
IMM GRANULOCYTES # BLD AUTO: 0 X10*3/UL (ref 0–0.7)
IMM GRANULOCYTES NFR BLD AUTO: 0 % (ref 0–0.9)
LACTATE BLDV-SCNC: 1.2 MMOL/L (ref 0.4–2)
LYMPHOCYTES # BLD AUTO: 3.31 X10*3/UL (ref 1.2–4.8)
LYMPHOCYTES NFR BLD AUTO: 57.8 %
MCH RBC QN AUTO: 31 PG (ref 26–34)
MCHC RBC AUTO-ENTMCNC: 35.6 G/DL (ref 32–36)
MCV RBC AUTO: 87 FL (ref 80–100)
MONOCYTES # BLD AUTO: 0.5 X10*3/UL (ref 0.1–1)
MONOCYTES NFR BLD AUTO: 8.7 %
NEUTROPHILS # BLD AUTO: 1.84 X10*3/UL (ref 1.2–7.7)
NEUTROPHILS NFR BLD AUTO: 32.1 %
NRBC BLD-RTO: 0 /100 WBCS (ref 0–0)
OXYHGB MFR BLDV: 70.2 % (ref 45–75)
P AXIS: 74 DEGREES
P OFFSET: 205 MS
P ONSET: 145 MS
PCO2 BLDV: 45 MM HG (ref 41–51)
PH BLDV: 7.46 PH (ref 7.33–7.43)
PLATELET # BLD AUTO: 170 X10*3/UL (ref 150–450)
PO2 BLDV: 46 MM HG (ref 35–45)
POTASSIUM BLDV-SCNC: 3.9 MMOL/L (ref 3.5–5.3)
POTASSIUM SERPL-SCNC: 3.7 MMOL/L (ref 3.5–5.3)
PR INTERVAL: 166 MS
PROT SERPL-MCNC: 7.2 G/DL (ref 6.4–8.2)
Q ONSET: 228 MS
QRS COUNT: 8 BEATS
QRS DURATION: 104 MS
QT INTERVAL: 418 MS
QTC CALCULATION(BAZETT): 377 MS
QTC FREDERICIA: 390 MS
R AXIS: 16 DEGREES
RBC # BLD AUTO: 4.49 X10*6/UL (ref 4.5–5.9)
SAO2 % BLDV: 72 % (ref 45–75)
SARS-COV-2 RNA RESP QL NAA+PROBE: NOT DETECTED
SODIUM BLDV-SCNC: 138 MMOL/L (ref 136–145)
SODIUM SERPL-SCNC: 141 MMOL/L (ref 136–145)
T AXIS: -46 DEGREES
T OFFSET: 437 MS
VENTRICULAR RATE: 49 BPM
WBC # BLD AUTO: 5.7 X10*3/UL (ref 4.4–11.3)

## 2025-01-11 PROCEDURE — 99285 EMERGENCY DEPT VISIT HI MDM: CPT | Mod: 25 | Performed by: EMERGENCY MEDICINE

## 2025-01-11 PROCEDURE — 99285 EMERGENCY DEPT VISIT HI MDM: CPT | Performed by: EMERGENCY MEDICINE

## 2025-01-11 PROCEDURE — 71046 X-RAY EXAM CHEST 2 VIEWS: CPT

## 2025-01-11 PROCEDURE — 80053 COMPREHEN METABOLIC PANEL: CPT

## 2025-01-11 PROCEDURE — 84132 ASSAY OF SERUM POTASSIUM: CPT

## 2025-01-11 PROCEDURE — 36415 COLL VENOUS BLD VENIPUNCTURE: CPT

## 2025-01-11 PROCEDURE — 85025 COMPLETE CBC W/AUTO DIFF WBC: CPT

## 2025-01-11 PROCEDURE — 87636 SARSCOV2 & INF A&B AMP PRB: CPT

## 2025-01-11 PROCEDURE — 82435 ASSAY OF BLOOD CHLORIDE: CPT

## 2025-01-11 PROCEDURE — 84484 ASSAY OF TROPONIN QUANT: CPT

## 2025-01-11 PROCEDURE — 93005 ELECTROCARDIOGRAM TRACING: CPT

## 2025-01-11 PROCEDURE — 84295 ASSAY OF SERUM SODIUM: CPT

## 2025-01-11 PROCEDURE — 85379 FIBRIN DEGRADATION QUANT: CPT

## 2025-01-11 PROCEDURE — 71046 X-RAY EXAM CHEST 2 VIEWS: CPT | Mod: FOREIGN READ | Performed by: RADIOLOGY

## 2025-01-11 RX ORDER — HYDROGEN PEROXIDE 3 %
SOLUTION, NON-ORAL MISCELLANEOUS
Status: DISCONTINUED
Start: 2025-01-11 | End: 2025-01-11 | Stop reason: HOSPADM

## 2025-01-11 ASSESSMENT — PAIN DESCRIPTION - PAIN TYPE: TYPE: ACUTE PAIN

## 2025-01-11 ASSESSMENT — PAIN DESCRIPTION - LOCATION: LOCATION: CHEST

## 2025-01-11 ASSESSMENT — LIFESTYLE VARIABLES
EVER FELT BAD OR GUILTY ABOUT YOUR DRINKING: NO
TOTAL SCORE: 0
EVER HAD A DRINK FIRST THING IN THE MORNING TO STEADY YOUR NERVES TO GET RID OF A HANGOVER: NO
HAVE YOU EVER FELT YOU SHOULD CUT DOWN ON YOUR DRINKING: NO
HAVE PEOPLE ANNOYED YOU BY CRITICIZING YOUR DRINKING: NO

## 2025-01-11 ASSESSMENT — COLUMBIA-SUICIDE SEVERITY RATING SCALE - C-SSRS
6. HAVE YOU EVER DONE ANYTHING, STARTED TO DO ANYTHING, OR PREPARED TO DO ANYTHING TO END YOUR LIFE?: NO
1. IN THE PAST MONTH, HAVE YOU WISHED YOU WERE DEAD OR WISHED YOU COULD GO TO SLEEP AND NOT WAKE UP?: NO
2. HAVE YOU ACTUALLY HAD ANY THOUGHTS OF KILLING YOURSELF?: NO

## 2025-01-11 ASSESSMENT — PAIN - FUNCTIONAL ASSESSMENT: PAIN_FUNCTIONAL_ASSESSMENT: 0-10

## 2025-01-11 ASSESSMENT — PAIN SCALES - GENERAL: PAINLEVEL_OUTOF10: 6

## 2025-01-11 NOTE — DISCHARGE INSTRUCTIONS
You were seen in the ED for your chest pain. Our exams and lab test here did not reveal any abnormalities and did not show any serious causes for your chest pain at this time. Please return to the ED if these symptoms persist or worsen, or if you have any other concerns. We recommend following up with your primary care provider for further workup if needed.

## 2025-01-11 NOTE — ED TRIAGE NOTES
"Pt came to ED from home with complaints of chest pain that started 30 minutes ago. Per report, pt has a history of HTN, PE (on Eliquis) and currently has strep (on antibiotics for 3 days). Upon assessment, pt states some SOB and a brace on his R foot from a \"collapsed arch\".  "

## 2025-01-11 NOTE — ED PROVIDER NOTES
Emergency Department Provider Note        History of Present Illness     History provided by: Patient  Limitations to History: None  External Records Reviewed with Brief Summary: None    HPI:  Eleazar Burrell is a 59 y.o. male with past medical history of PE (on Eliquis), presenting with chest pain.  Patient reports about an hour ago while he was sitting at his desk at home started experiencing chest pain, which came on gradually midsternal.  Described as sharp initially and now subsiding, and feeling more like a dull chest tightness. Pain does not radiate, 5/10 pain. Pain exacerbated by deep inspiration. Also notes some very mild SOB, that is improving. Denies any fevers, chills, diaphoresis or vomiting. Also notes he has strep throat and is on day 3 of antibiotics for it.  Patient reports he has had similar episodes of this pain in the past and is usually preceded by stress.     Physical Exam   Triage vitals:  T 36.3 °C (97.4 °F)  HR 50  /71  RR 16  O2 98 % None (Room air)    General: Awake, alert, in no acute distress  Eyes: Gaze conjugate.  No scleral icterus or injection  HENT: Normo-cephalic, atraumatic. No stridor  CV: Bradycardic rate, regular rhythm. Radial pulses 2+ bilaterally  Resp: Breathing non-labored, speaking in full sentences.  Clear to auscultation bilaterally  GI: Soft, non-distended, non-tender. No rebound or guarding.  MSK/Extremities: No gross bony deformities. Moving all extremities  Skin: Warm. Appropriate color  Neuro: Alert. Oriented. Face symmetric. Speech is fluent.  Gross strength and sensation intact in b/l UE and LEs  Psych: Appropriate mood and affect    Medical Decision Making & ED Course   Medical Decision Makin y.o. male past medical history of PE (on Eliquis), presenting with chest pain.   On exam patient is stable, comfortable at rest.  Overall benign exam, lung sounds are clear and satting adequately on room air with no increased work of breathing.  Currently  experiencing mild chest discomfort described as a heaviness midsternal.   EKG with no ischemic change.  CBC with no leukocytosis or anemia.  Chest x-ray with no evidence of acute pulmonary abnormality, therefore low suspicion for pneumonia,  pneumothorax or effusion.   COVID and Influenza testing negative.    D-dimer within normal limits, therefore low suspicion for PE.  Troponin within normal limits, low suspicion for ACS or cardiac pathology given normal EKG as well.    On reexamination, patient is still stable, with significant improvement in his chest discomfort.  Given benign exam and no abnormalities found on lab testing, chest Xray or EKG, patient will be discharged with strict return precautions and recommendation to follow up with his PCP for further evaluation.        ----      Differential diagnoses considered include but are not limited to: ACS, pneumonia, pneumothorax, PE     Social Determinants of Health which Significantly Impact Care: None identified     EKG Independent Interpretation: The EKG obtained at 1:49am was independently interpreted by myself. It demonstrates normal sinus rhythm with a ventricular rate of 49. Normal axis. Intervals no evidence of prolongation. ST segments showed no ischemic changes.     Independent Result Review and Interpretation: Relevant laboratory and radiographic results were reviewed and independently interpreted by myself.  As necessary, they are commented on in the ED Course.    Chronic conditions affecting the patient's care: As documented above in [facility]    The patient was discussed with the following consultants/services: None    Care Considerations: As documented above in [facility]    ED Course:  ED Course as of 01/11/25 0405   Sat Jan 11, 2025   0219 ATTENDING ATTESTATION  59-year-old male with multiple medical comorbidities most notable for history of PE on anticoagulation last dose earlier today presenting to the emergency department with a chest pain, central sternal.   Patient states he had similar episodes multiple times in the past always precipitated by stress, states that he has some neighbors who are having some children over earlier today and it was causing him to feel stressed out and he said he had gradual onset of symptoms thereafter.  He denies any fevers or chills no cough or congestion no recent illness no falls or injuries or trauma.  Patient well-appearing at the bedside is hemodynamically stable he has no fevers no tachycardia he has stable saturation clear lungs unremarkable cardiopulmonary exam soft abdomen.  The patient has symmetric upper extremity pulses.  Patient's gas is reassuring and normal.  His EKG was reassuring nonischemic.  We will obtain a chest x-ray but low suspicion for pneumothorax, pneumonia with clear lungs and adequate aeration to the bases.  We will assess for possible viral etiology.  We will obtain a dimer given the patient's history of PE on anticoagulation, his vital signs suggest that this is not  his primary pathology.  Patient's disposition is pending results, anticipate if everything is negative likely disposition home  [facility] [RH]      ED Course User Index  [RH] Lalo Rogel DO         Diagnoses as of 01/11/25 0405   Chest pain, unspecified type     Disposition   As a result of the work-up, the patient was discharged home.  he was informed of his diagnosis and instructed to come back with any concerns or worsening of condition.  he and was agreeable to the plan as discussed above.  he was given the opportunity to ask questions.  All of the patient's questions were answered.    Procedures   Procedures    Patient seen and discussed with ED attending physician.    Adonis Healy MD  Emergency Medicine      Adonis Healy MD  Resident  01/11/25 4861

## 2025-02-06 ENCOUNTER — HOSPITAL ENCOUNTER (EMERGENCY)
Facility: HOSPITAL | Age: 60
Discharge: LEFT BEFORE DISCHARGE | End: 2025-02-06
Payer: COMMERCIAL

## 2025-02-06 PROCEDURE — 4500999001 HC ED NO CHARGE

## 2025-02-23 ENCOUNTER — HOSPITAL ENCOUNTER (EMERGENCY)
Facility: HOSPITAL | Age: 60
Discharge: HOME | End: 2025-02-23
Payer: MEDICARE

## 2025-02-23 VITALS
HEIGHT: 68 IN | HEART RATE: 59 BPM | OXYGEN SATURATION: 99 % | BODY MASS INDEX: 27.28 KG/M2 | WEIGHT: 180 LBS | DIASTOLIC BLOOD PRESSURE: 72 MMHG | SYSTOLIC BLOOD PRESSURE: 131 MMHG | RESPIRATION RATE: 18 BRPM | TEMPERATURE: 97.6 F

## 2025-02-23 DIAGNOSIS — L03.90 CELLULITIS, UNSPECIFIED CELLULITIS SITE: ICD-10-CM

## 2025-02-23 DIAGNOSIS — L02.91 ABSCESS: Primary | ICD-10-CM

## 2025-02-23 PROCEDURE — 99283 EMERGENCY DEPT VISIT LOW MDM: CPT

## 2025-02-23 PROCEDURE — 2500000002 HC RX 250 W HCPCS SELF ADMINISTERED DRUGS (ALT 637 FOR MEDICARE OP, ALT 636 FOR OP/ED): Performed by: PHYSICIAN ASSISTANT

## 2025-02-23 PROCEDURE — 2500000001 HC RX 250 WO HCPCS SELF ADMINISTERED DRUGS (ALT 637 FOR MEDICARE OP): Performed by: PHYSICIAN ASSISTANT

## 2025-02-23 RX ORDER — SULFAMETHOXAZOLE AND TRIMETHOPRIM 800; 160 MG/1; MG/1
1 TABLET ORAL 2 TIMES DAILY
Qty: 14 TABLET | Refills: 0 | Status: SHIPPED | OUTPATIENT
Start: 2025-02-23 | End: 2025-03-02

## 2025-02-23 RX ORDER — ACETAMINOPHEN 325 MG/1
975 TABLET ORAL ONCE
Status: COMPLETED | OUTPATIENT
Start: 2025-02-23 | End: 2025-02-23

## 2025-02-23 RX ORDER — SULFAMETHOXAZOLE AND TRIMETHOPRIM 800; 160 MG/1; MG/1
1 TABLET ORAL ONCE
Status: COMPLETED | OUTPATIENT
Start: 2025-02-23 | End: 2025-02-23

## 2025-02-23 RX ADMIN — ACETAMINOPHEN 975 MG: 325 TABLET ORAL at 23:21

## 2025-02-23 RX ADMIN — SULFAMETHOXAZOLE AND TRIMETHOPRIM 1 TABLET: 800; 160 TABLET ORAL at 23:17

## 2025-02-23 ASSESSMENT — PAIN - FUNCTIONAL ASSESSMENT: PAIN_FUNCTIONAL_ASSESSMENT: 0-10

## 2025-02-23 ASSESSMENT — PAIN DESCRIPTION - PAIN TYPE: TYPE: ACUTE PAIN

## 2025-02-23 ASSESSMENT — PAIN DESCRIPTION - LOCATION: LOCATION: GROIN

## 2025-02-23 ASSESSMENT — PAIN SCALES - GENERAL: PAINLEVEL_OUTOF10: 5 - MODERATE PAIN

## 2025-02-24 NOTE — ED TRIAGE NOTES
Pt presents to the ED d/t a boil on his lower abdomen right above his groin area. Pt is AxOx4/VSS. Pt states it is currently not leaking but it has an odor.

## 2025-02-24 NOTE — ED PROVIDER NOTES
"Emergency Department Encounter  [facility] EMERGENCY MEDICINE    Patient: Eleazar Burrell  MRN: [id number]  : 1965  Date of Evaluation: 2025  ED Provider: Lanie Rai PA-C        History of Present Illness     58 y/o male with PMH of DVT/PE on Eliquis, schizoaffective disorder and bipolar disorder presents to the ED via EMS with a boil he noticed today. Pt stated the boil/swelling is on the suprapubic area. It started as a small boil and has gotten more swollen today. He denies any drainage from the area. Pt endorses a foul odor.  He is unsure if there has been any drainage but denies seeing any drainage on his underpants. pt states it is only painful when he sits down. Pt denies swollen testiculars, fevers, chills, abdominal pain, n/v/d.  He denies any history of diabetes.  He states otherwise has been in his normal state of health.      History provided by:  Patient   used: No             Visit Vitals  /72 (BP Location: Left arm, Patient Position: Sitting)   Pulse 59   Temp 36.4 °C (97.6 °F) (Temporal)   Resp 18   Ht 1.727 m (5' 8\")   Wt 81.6 kg (180 lb)   SpO2 99%   BMI 27.37 kg/m²   Smoking Status Never   BSA 1.98 m²          Physical Exam       Triage vitals:  T 36.4 °C (97.6 °F)  HR 59  /72  RR 18  O2 99 % None (Room air)    Physical Exam     Physical exam:   General: Vitals noted, no distress. Afebrile.   EENT: Hearing grossly intact. Eyes unremarkable. Normal phonation. MMM.   Cardiac: Regular, rate, rhythm, no murmur.   Pulmonary: Lungs clear bilaterally with good aeration. No adventitious breath sounds.   Abdomen: Soft, nontender, nonsurgical. No peritoneal signs. Normoactive bowel sounds.   : Chaperone present.  3 x 3 cm area of induration and erythema to the suprapubic region with small central area of fluctuance.  Small mount of crusted drainage present over the area of fluctuance.  No crepitus to the skin.  No abnormalities noted " to the testicles.  No testicular pain.  Vertical, nontender testicles. No inguinal lymphadenopathy. No inguinal mass. No hernia. No discharge, drainage, ulcerations, lesions, rash.   Extremities: No peripheral edema. Full range of motion.    Skin: No rash.   Neuro: No focal neurologic deficits.      Results       Labs Reviewed - No data to display    Point of Care Ultrasound    (Results Pending)         Medical Decision Making & ED Course         ED Course & [facility]     Medical Decision Making  This is a 59-year-old male with past medical history of DVT/PE on Eliquis who presents to the ED with an area of swelling and pain to his suprapubic region.  Vital stable upon arrival to the ED.  On examination patient did have a 3 x 3 cm area of induration, erythema, and excess warmth to his suprapubic region that was concerning for cellulitis with a central area of fluctuance.  There was a small amount of crusted material noted on the central area of fluctuance suggestive of possible drainage.  Bedside ultrasound was performed and during bedside ultrasound abscess began spontaneously draining without need for I&D or further drainage as there was no significant fluid collection noted on ultrasound following this.  Patient was advised to hold warm compresses to the area.  He was given a dose of Bactrim here in the ED due to the cellulitis.  No evidence of Zan's gangrene at this time.  Patient was given strict return precautions concerning this infection.  He was advised to follow close with his primary care provider.  He is not a diabetic.  He was written a prescription for Bactrim to go home with.  He was agreeable to this plan.  He had no further questions at this time and was discharged from the ED in stable condition.    Risk  Prescription drug management.         Diagnoses as of 02/23/25 4411   Abscess   Cellulitis, unspecified cellulitis site          Social Determinants of Health which Significantly Impact Care:  Mental health disorder         Disposition   As a result of the work-up, the patient was discharged home.  he was informed of his diagnosis and instructed to come back with any concerns or worsening of condition.  he and was agreeable to the plan as discussed above.  he was given the opportunity to ask questions.  All of the patient's questions were answered.    Procedures     Patient was seen independently    Procedures    Lanie Rai PA-C  Emergency Medicine      Lanie Rai PA-C  02/23/25 0365

## 2025-03-04 ENCOUNTER — HOSPITAL ENCOUNTER (EMERGENCY)
Facility: HOSPITAL | Age: 60
Discharge: LEFT BEFORE DISCHARGE | End: 2025-03-04
Payer: MEDICARE

## 2025-03-04 PROCEDURE — 99283 EMERGENCY DEPT VISIT LOW MDM: CPT

## 2025-03-04 PROCEDURE — 4500999001 HC ED NO CHARGE: Performed by: STUDENT IN AN ORGANIZED HEALTH CARE EDUCATION/TRAINING PROGRAM

## 2025-03-06 ENCOUNTER — HOSPITAL ENCOUNTER (EMERGENCY)
Facility: HOSPITAL | Age: 60
Discharge: HOME | End: 2025-03-07
Attending: EMERGENCY MEDICINE
Payer: MEDICARE

## 2025-03-06 VITALS
WEIGHT: 175 LBS | BODY MASS INDEX: 26.52 KG/M2 | HEART RATE: 62 BPM | RESPIRATION RATE: 16 BRPM | DIASTOLIC BLOOD PRESSURE: 84 MMHG | TEMPERATURE: 98.5 F | SYSTOLIC BLOOD PRESSURE: 134 MMHG | OXYGEN SATURATION: 96 % | HEIGHT: 68 IN

## 2025-03-06 DIAGNOSIS — Z59.19 HOUSING UNSATISFACTORY: Primary | ICD-10-CM

## 2025-03-06 PROCEDURE — 99284 EMERGENCY DEPT VISIT MOD MDM: CPT | Performed by: EMERGENCY MEDICINE

## 2025-03-06 PROCEDURE — 82947 ASSAY GLUCOSE BLOOD QUANT: CPT

## 2025-03-06 PROCEDURE — 99283 EMERGENCY DEPT VISIT LOW MDM: CPT | Performed by: EMERGENCY MEDICINE

## 2025-03-06 PROCEDURE — 2500000001 HC RX 250 WO HCPCS SELF ADMINISTERED DRUGS (ALT 637 FOR MEDICARE OP)

## 2025-03-06 RX ORDER — DOXYCYCLINE HYCLATE 100 MG
100 TABLET ORAL ONCE
Status: COMPLETED | OUTPATIENT
Start: 2025-03-06 | End: 2025-03-06

## 2025-03-06 RX ADMIN — DOXYCYCLINE HYCLATE 100 MG: 100 TABLET, COATED ORAL at 23:54

## 2025-03-06 SDOH — ECONOMIC STABILITY - HOUSING INSECURITY: OTHER INADEQUATE HOUSING: Z59.19

## 2025-03-06 ASSESSMENT — PAIN SCALES - GENERAL: PAINLEVEL_OUTOF10: 0 - NO PAIN

## 2025-03-06 ASSESSMENT — PAIN - FUNCTIONAL ASSESSMENT: PAIN_FUNCTIONAL_ASSESSMENT: 0-10

## 2025-03-07 LAB — GLUCOSE BLD MANUAL STRIP-MCNC: 99 MG/DL (ref 74–99)

## 2025-03-07 NOTE — ED PROVIDER NOTES
History of Present Illness     History provided by: Patient  Limitations to History: Mental Illness  External Records Reviewed with Brief Summary:  ED visit from 3/5/2025, present to for cellulitis and possible abscess of abdominal wall    HPI:  Eleazar Burrell is a 59 y.o. male past medical history of schizophrenia hypertension hyperlipidemia who presents today for housing concerns.  Patient states that he is concerned that his housing situation is unsafe for him.  He feels that people are breaking into his house and stealing his stuff.  He states that there are immigrants to have access to the keys for the building and they are coming in and stealing small stuff from him such as a calculator and food.  He denies any SI, HI, AVH.  He does endorse that he also feels like there are a lot of fumes where he lives, and these occasionally make him dizzy.  He states that it smells odd there, he is unable to provide any clear description of the smell, but denies any loss of consciousness, no blurry vision.  He denies any numbness weakness or tingling in his arms or legs.  He denies any difficulty with urination.  He does endorse a recent start doxycycline for a cellulitis of his abdomen.  He states that he would either like to talk to social work here, or stay until 9 AM when he can call his  at [facility].  He otherwise has no acute complaints.    Physical Exam   Triage vitals:  T 36.9 °C (98.5 °F)  HR 62  /84  RR 16  O2 96 % None (Room air)    Physical Exam  Vitals and nursing note reviewed.   Constitutional:       General: He is not in acute distress.     Appearance: He is well-developed.   HENT:      Head: Normocephalic and atraumatic.   Eyes:      Conjunctiva/sclera: Conjunctivae normal.   Cardiovascular:      Rate and Rhythm: Normal rate and regular rhythm.      Heart sounds: No murmur heard.  Pulmonary:      Effort: Pulmonary effort is normal. No respiratory distress.      Breath sounds:  Normal breath sounds.   Abdominal:      Palpations: Abdomen is soft.      Tenderness: There is no abdominal tenderness.   Musculoskeletal:         General: No swelling.      Cervical back: Neck supple.   Skin:     General: Skin is warm and dry.      Capillary Refill: Capillary refill takes less than 2 seconds.   Neurological:      Mental Status: He is alert.   Psychiatric:      Comments: Flat affect, denies SI, HI, AVH.  Possible bizarre delusion of toxic fumes and people breaking into his house to steal food.  Previous documented psych exam similar          Medical Decision Making & ED Course   Medical Decision Makin y.o. male past medical history of schizophrenia hypertension hyperlipidemia who presents today for housing concerns.  Is unclear whether or not the patient's living situation is truly on safe versus the patient is having bizarre delusions regarding his housing situation.  However, he does not appear to be an acute threat to himself or others, does appear to have been compliant with medications.  Given this, we will get a fingerstick glucose to confirm the patient is not having any dizziness or strange neurologic symptoms secondary to hypo or hyperglycemia.  Given the patient does have a , I believe the most appropriate person to discuss his care with him would be his .  Given this, we will discharge him to the lobby and have him reach out to his  in the morning.  Patient will be formally discharged, but we allowed to stay in the lobby due to safety concerns regarding his disposition.  ----      Differential diagnoses considered include but are not limited to: Schizophrenia, possible bizarre delusion, unsafe housing situation     Social Determinants of Health which Significantly Impact Care: Poor housing conditions and Mental health disorder The following actions were taken to address these social determinants: Will have the patient wait in the emergency department  candelario and contact his  in the morning    EKG Independent Interpretation: EKG not obtained    Independent Result Review and Interpretation: Relevant laboratory and radiographic results were reviewed and independently interpreted by myself.  As necessary, they are commented on in the ED Course.    Chronic conditions affecting the patient's care: As documented above in [facility]    The patient was discussed with the following consultants/services: None    Care Considerations: As documented above in [facility]    ED Course:  Diagnoses as of 03/06/25 2357   Housing unsatisfactory     Disposition   As a result of the work-up, the patient was discharged home.  he was informed of his diagnosis and instructed to come back with any concerns or worsening of condition.  he and was agreeable to the plan as discussed above.  he was given the opportunity to ask questions.  All of the patient's questions were answered.    Procedures   Procedures    Patient seen and discussed with ED attending physician.    Bogdan Heard MD  Emergency Medicine       Bogdan Heard MD  Resident  03/07/25 0925

## 2025-03-07 NOTE — ED TRIAGE NOTES
"Patient presents to the ED to be seen by a  due to not feeling safe in his apartment along with looking for a new place to stay. Patient states residents in his apartment are \"smoking it up, with toxic fumes\". Endorsed someone attempting to burglarize his home, endorsed barricading the door for safety.   "

## 2025-03-07 NOTE — PROGRESS NOTES
Eleazar Burrell is a 59 y.o. male     Assessment/Plan   SW consulted for discharge planning. Pt allegedly wanted to call his CM this am at 9am. SW located in Pt's records he works with [employer] [phone]. Went to locate Pt in the lobby and he was no longer there. FIONA informed team to discharge Pt.   PHILLIP Villareal

## 2025-03-07 NOTE — DISCHARGE INSTRUCTIONS
You were seen today for concerns regarding your housing.  You wanted to stay here until you are able to talk to your , which we will allow to stay in the lobby.  Please return here if you require any other medical care.

## 2025-03-16 ENCOUNTER — HOSPITAL ENCOUNTER (EMERGENCY)
Facility: HOSPITAL | Age: 60
Discharge: HOME | End: 2025-03-16
Attending: EMERGENCY MEDICINE
Payer: MEDICARE

## 2025-03-16 VITALS
BODY MASS INDEX: 27.28 KG/M2 | OXYGEN SATURATION: 98 % | RESPIRATION RATE: 16 BRPM | TEMPERATURE: 97.9 F | SYSTOLIC BLOOD PRESSURE: 106 MMHG | WEIGHT: 180 LBS | HEART RATE: 52 BPM | DIASTOLIC BLOOD PRESSURE: 73 MMHG | HEIGHT: 68 IN

## 2025-03-16 DIAGNOSIS — Z59.9 HOUSING PROBLEMS: Primary | ICD-10-CM

## 2025-03-16 LAB
ALBUMIN SERPL BCP-MCNC: 3.9 G/DL (ref 3.4–5)
ALP SERPL-CCNC: 62 U/L (ref 33–136)
ALT SERPL W P-5'-P-CCNC: 34 U/L (ref 10–52)
ANION GAP BLDV CALCULATED.4IONS-SCNC: 6 MMOL/L (ref 10–25)
ANION GAP BLDV CALCULATED.4IONS-SCNC: 6 MMOL/L (ref 10–25)
ANION GAP SERPL CALC-SCNC: 15 MMOL/L (ref 10–20)
AST SERPL W P-5'-P-CCNC: 53 U/L (ref 9–39)
BASE EXCESS BLDV CALC-SCNC: 4.3 MMOL/L (ref -2–3)
BASE EXCESS BLDV CALC-SCNC: 5.1 MMOL/L (ref -2–3)
BASOPHILS # BLD AUTO: 0.03 X10*3/UL (ref 0–0.1)
BASOPHILS NFR BLD AUTO: 0.4 %
BILIRUB SERPL-MCNC: 0.5 MG/DL (ref 0–1.2)
BODY TEMPERATURE: 37 DEGREES CELSIUS
BODY TEMPERATURE: 37 DEGREES CELSIUS
BUN SERPL-MCNC: 17 MG/DL (ref 6–23)
CA-I BLDV-SCNC: 1.24 MMOL/L (ref 1.1–1.33)
CA-I BLDV-SCNC: 1.27 MMOL/L (ref 1.1–1.33)
CALCIUM SERPL-MCNC: 9.4 MG/DL (ref 8.6–10.6)
CHLORIDE BLDV-SCNC: 105 MMOL/L (ref 98–107)
CHLORIDE BLDV-SCNC: 105 MMOL/L (ref 98–107)
CHLORIDE SERPL-SCNC: 104 MMOL/L (ref 98–107)
CO2 SERPL-SCNC: 27 MMOL/L (ref 21–32)
CREAT SERPL-MCNC: 1.15 MG/DL (ref 0.5–1.3)
EGFRCR SERPLBLD CKD-EPI 2021: 72 ML/MIN/1.73M*2
EOSINOPHIL # BLD AUTO: 0.12 X10*3/UL (ref 0–0.7)
EOSINOPHIL NFR BLD AUTO: 1.6 %
ERYTHROCYTE [DISTWIDTH] IN BLOOD BY AUTOMATED COUNT: 13.6 % (ref 11.5–14.5)
GLUCOSE BLDV-MCNC: 73 MG/DL (ref 74–99)
GLUCOSE BLDV-MCNC: 77 MG/DL (ref 74–99)
GLUCOSE SERPL-MCNC: 78 MG/DL (ref 74–99)
HCO3 BLDV-SCNC: 31.1 MMOL/L (ref 22–26)
HCO3 BLDV-SCNC: 31.8 MMOL/L (ref 22–26)
HCT VFR BLD AUTO: 37.4 % (ref 41–52)
HCT VFR BLD EST: 40 % (ref 41–52)
HCT VFR BLD EST: 40 % (ref 41–52)
HGB BLD-MCNC: 12.6 G/DL (ref 13.5–17.5)
HGB BLDV-MCNC: 13.2 G/DL (ref 13.5–17.5)
HGB BLDV-MCNC: 13.2 G/DL (ref 13.5–17.5)
IMM GRANULOCYTES # BLD AUTO: 0.02 X10*3/UL (ref 0–0.7)
IMM GRANULOCYTES NFR BLD AUTO: 0.3 % (ref 0–0.9)
LACTATE BLDV-SCNC: 1.6 MMOL/L (ref 0.4–2)
LACTATE BLDV-SCNC: 1.8 MMOL/L (ref 0.4–2)
LYMPHOCYTES # BLD AUTO: 3.84 X10*3/UL (ref 1.2–4.8)
LYMPHOCYTES NFR BLD AUTO: 52.2 %
MCH RBC QN AUTO: 30.7 PG (ref 26–34)
MCHC RBC AUTO-ENTMCNC: 33.7 G/DL (ref 32–36)
MCV RBC AUTO: 91 FL (ref 80–100)
MONOCYTES # BLD AUTO: 0.65 X10*3/UL (ref 0.1–1)
MONOCYTES NFR BLD AUTO: 8.8 %
NEUTROPHILS # BLD AUTO: 2.69 X10*3/UL (ref 1.2–7.7)
NEUTROPHILS NFR BLD AUTO: 36.7 %
NRBC BLD-RTO: 0 /100 WBCS (ref 0–0)
OXYHGB MFR BLDV: 45.3 % (ref 45–75)
OXYHGB MFR BLDV: 58.2 % (ref 45–75)
PCO2 BLDV: 55 MM HG (ref 41–51)
PCO2 BLDV: 55 MM HG (ref 41–51)
PH BLDV: 7.36 PH (ref 7.33–7.43)
PH BLDV: 7.37 PH (ref 7.33–7.43)
PLATELET # BLD AUTO: 153 X10*3/UL (ref 150–450)
PO2 BLDV: 35 MM HG (ref 35–45)
PO2 BLDV: 41 MM HG (ref 35–45)
POTASSIUM BLDV-SCNC: 3.9 MMOL/L (ref 3.5–5.3)
POTASSIUM BLDV-SCNC: 4.1 MMOL/L (ref 3.5–5.3)
POTASSIUM SERPL-SCNC: 4.1 MMOL/L (ref 3.5–5.3)
PROT SERPL-MCNC: 6.8 G/DL (ref 6.4–8.2)
RBC # BLD AUTO: 4.11 X10*6/UL (ref 4.5–5.9)
SAO2 % BLDV: 46 % (ref 45–75)
SAO2 % BLDV: 59 % (ref 45–75)
SODIUM BLDV-SCNC: 138 MMOL/L (ref 136–145)
SODIUM BLDV-SCNC: 139 MMOL/L (ref 136–145)
SODIUM SERPL-SCNC: 142 MMOL/L (ref 136–145)
WBC # BLD AUTO: 7.4 X10*3/UL (ref 4.4–11.3)

## 2025-03-16 PROCEDURE — 99284 EMERGENCY DEPT VISIT MOD MDM: CPT | Performed by: EMERGENCY MEDICINE

## 2025-03-16 PROCEDURE — 99283 EMERGENCY DEPT VISIT LOW MDM: CPT | Performed by: EMERGENCY MEDICINE

## 2025-03-16 PROCEDURE — 84132 ASSAY OF SERUM POTASSIUM: CPT

## 2025-03-16 PROCEDURE — 85025 COMPLETE CBC W/AUTO DIFF WBC: CPT

## 2025-03-16 PROCEDURE — 36415 COLL VENOUS BLD VENIPUNCTURE: CPT

## 2025-03-16 PROCEDURE — 82375 ASSAY CARBOXYHB QUANT: CPT

## 2025-03-16 SDOH — ECONOMIC STABILITY - INCOME SECURITY: PROBLEM RELATED TO HOUSING AND ECONOMIC CIRCUMSTANCES, UNSPECIFIED: Z59.9

## 2025-03-16 ASSESSMENT — COLUMBIA-SUICIDE SEVERITY RATING SCALE - C-SSRS
1. IN THE PAST MONTH, HAVE YOU WISHED YOU WERE DEAD OR WISHED YOU COULD GO TO SLEEP AND NOT WAKE UP?: NO
2. HAVE YOU ACTUALLY HAD ANY THOUGHTS OF KILLING YOURSELF?: NO
6. HAVE YOU EVER DONE ANYTHING, STARTED TO DO ANYTHING, OR PREPARED TO DO ANYTHING TO END YOUR LIFE?: NO

## 2025-03-16 ASSESSMENT — PAIN - FUNCTIONAL ASSESSMENT: PAIN_FUNCTIONAL_ASSESSMENT: 0-10

## 2025-03-16 ASSESSMENT — PAIN SCALES - GENERAL: PAINLEVEL_OUTOF10: 0 - NO PAIN

## 2025-03-16 NOTE — ED TRIAGE NOTES
Pt is a 60yo male presenting to [facility] ED by way of EMS> reports pt to have been exposed to an unknown type of toxin outside his apartment door with initial pulse ox of 94%-96% upon arrival. Pt endorses's SOB due to unknown toxin. Pt denies HA, Chest pain, Dizziness, visual disturbances, AH/VH/TH. Pt is hemodynamically stable, 98% oxygen saturation on room air upon admission. Pt is in no distress, AO4.

## 2025-03-16 NOTE — DISCHARGE INSTRUCTIONS
You were seen today in the emergency department and labs were done that did not show any concerns for toxins in your blood.  Please return to the department if you have any new or worsening conditions.

## 2025-03-16 NOTE — ED PROVIDER NOTES
History of Present Illness     History provided by: Patient  Limitations to History: None    HPI:  Eleazar Burrell is a 59 y.o. male past medical history of schizophrenia hypertension hyperlipidemia presenting with concern that he has toxins in his blood, states that there was a gas leak at his apartment.  Patient states that he has felt pressure behind his eyes for 2 weeks now that has not improved.  He denies any vision changes, blurry vision, dizziness.  He has not had any falls or hit his head.  Patient states that he otherwise feels okay, denies any chest pain, shortness of breath, abdominal pain or urinary symptoms.  Patient denies any difficulty ambulating no numbness, weakness or tingling his arms and legs.  Patient states that he has work to do on his laptop and is asking for a  and plug.    Physical Exam   Triage vitals:  T 36.6 °C (97.9 °F)  HR 52  /73  RR 16  O2 98 % None (Room air)    General: Awake, alert, in no acute distress  Eyes: Gaze conjugate.  No scleral icterus or injection  HENT: Normo-cephalic, atraumatic. No stridor  CV: Regular rate, regular rhythm. Radial pulses 2+ bilaterally  Resp: Breathing non-labored, speaking in full sentences.  Clear to auscultation bilaterally  GI: Soft, non-distended, non-tender. No rebound or guarding.  MSK/Extremities: No gross bony deformities. Moving all extremities  Skin: Warm. Appropriate color  Neuro: Alert. Oriented. Face symmetric. Speech is fluent.  Gross strength and sensation intact in b/l UE and LEs  Psych: Appropriate mood and affect    Medical Decision Making & ED Course   Medical Decision Makin y.o. male presenting with concern for toxins in his blood and gas leak at his apartment. Triage vital signs reviewed and patient is hemodynamically stable at this time.  Patient is ambulating around the hallway with no difficulties, working on his laptop.  Patient is in no acute distress, no wheezing on physical exam.  No focal  neurological deficits.  Patient states that the pressure been his eyes has improved now that he has left his apartment. Differential includes but not limited to carbon monoxide poisoning, electrolyte abnormalities, lab work remarkable for glucose of 76, patient given snacks.  Coox panel done which was normal.  Patient request to be discharged, states that he no longer has pressure behind his eyes.  Discussed results of his labs with patient.  Patient discharged home in stable condition given return precautions.  Patient is going to follow-up with his  regarding his housing situation.      ED Course:  Diagnoses as of 03/16/25 0540   Housing problems       Independent Result Review and Interpretation: Relevant laboratory and radiographic results were reviewed and independently interpreted by myself.  As necessary, they are commented on in the ED Course.    Care Considerations: As documented above in MDM      Disposition   As a result of the work-up, the patient was discharged home.  he was informed of his diagnosis and instructed to come back with any concerns or worsening of condition.  he and was agreeable to the plan as discussed above.  he was given the opportunity to ask questions.  All of the patient's questions were answered.    Procedures   Procedures    Patient seen and discussed with ED attending physician.    Billie Woody DO  Emergency Medicine     Billie Woody DO  Resident  03/16/25 0600

## 2025-03-17 ENCOUNTER — HOSPITAL ENCOUNTER (EMERGENCY)
Facility: HOSPITAL | Age: 60
Discharge: HOME | End: 2025-03-18
Attending: STUDENT IN AN ORGANIZED HEALTH CARE EDUCATION/TRAINING PROGRAM
Payer: MEDICARE

## 2025-03-17 DIAGNOSIS — Z00.8 ENCOUNTER FOR PSYCHOLOGICAL EVALUATION: Primary | ICD-10-CM

## 2025-03-17 LAB
ATRIAL RATE: 46 BPM
P AXIS: 24 DEGREES
P OFFSET: 200 MS
P ONSET: 145 MS
PR INTERVAL: 162 MS
Q ONSET: 226 MS
QRS COUNT: 8 BEATS
QRS DURATION: 90 MS
QT INTERVAL: 450 MS
QTC CALCULATION(BAZETT): 393 MS
QTC FREDERICIA: 411 MS
R AXIS: 62 DEGREES
T AXIS: 139 DEGREES
T OFFSET: 451 MS
VENTRICULAR RATE: 46 BPM

## 2025-03-17 PROCEDURE — 99285 EMERGENCY DEPT VISIT HI MDM: CPT | Performed by: STUDENT IN AN ORGANIZED HEALTH CARE EDUCATION/TRAINING PROGRAM

## 2025-03-17 PROCEDURE — 80053 COMPREHEN METABOLIC PANEL: CPT

## 2025-03-17 PROCEDURE — 93005 ELECTROCARDIOGRAM TRACING: CPT

## 2025-03-17 PROCEDURE — 93010 ELECTROCARDIOGRAM REPORT: CPT

## 2025-03-17 PROCEDURE — 80307 DRUG TEST PRSMV CHEM ANLYZR: CPT

## 2025-03-17 PROCEDURE — 36415 COLL VENOUS BLD VENIPUNCTURE: CPT

## 2025-03-17 PROCEDURE — 80320 DRUG SCREEN QUANTALCOHOLS: CPT

## 2025-03-17 PROCEDURE — 85025 COMPLETE CBC W/AUTO DIFF WBC: CPT

## 2025-03-17 ASSESSMENT — COLUMBIA-SUICIDE SEVERITY RATING SCALE - C-SSRS
6. HAVE YOU EVER DONE ANYTHING, STARTED TO DO ANYTHING, OR PREPARED TO DO ANYTHING TO END YOUR LIFE?: YES
1. IN THE PAST MONTH, HAVE YOU WISHED YOU WERE DEAD OR WISHED YOU COULD GO TO SLEEP AND NOT WAKE UP?: YES
2. HAVE YOU ACTUALLY HAD ANY THOUGHTS OF KILLING YOURSELF?: NO
6. HAVE YOU EVER DONE ANYTHING, STARTED TO DO ANYTHING, OR PREPARED TO DO ANYTHING TO END YOUR LIFE?: NO

## 2025-03-17 ASSESSMENT — PAIN DESCRIPTION - DESCRIPTORS: DESCRIPTORS: HEADACHE

## 2025-03-17 ASSESSMENT — PAIN - FUNCTIONAL ASSESSMENT: PAIN_FUNCTIONAL_ASSESSMENT: 0-10

## 2025-03-17 ASSESSMENT — PAIN DESCRIPTION - PAIN TYPE: TYPE: ACUTE PAIN

## 2025-03-17 ASSESSMENT — PAIN SCALES - GENERAL: PAINLEVEL_OUTOF10: 2

## 2025-03-18 ENCOUNTER — CLINICAL SUPPORT (OUTPATIENT)
Dept: EMERGENCY MEDICINE | Facility: HOSPITAL | Age: 60
End: 2025-03-18
Payer: MEDICARE

## 2025-03-18 VITALS
RESPIRATION RATE: 16 BRPM | DIASTOLIC BLOOD PRESSURE: 80 MMHG | HEIGHT: 68 IN | HEART RATE: 52 BPM | OXYGEN SATURATION: 97 % | SYSTOLIC BLOOD PRESSURE: 128 MMHG | TEMPERATURE: 98.1 F | WEIGHT: 175 LBS | BODY MASS INDEX: 26.52 KG/M2

## 2025-03-18 LAB
ALBUMIN SERPL BCP-MCNC: 4 G/DL (ref 3.4–5)
ALP SERPL-CCNC: 61 U/L (ref 33–136)
ALT SERPL W P-5'-P-CCNC: 30 U/L (ref 10–52)
AMPHETAMINES UR QL SCN: NORMAL
ANION GAP SERPL CALC-SCNC: 12 MMOL/L (ref 10–20)
APAP SERPL-MCNC: <10 UG/ML
AST SERPL W P-5'-P-CCNC: 40 U/L (ref 9–39)
BARBITURATES UR QL SCN: NORMAL
BASOPHILS # BLD AUTO: 0.04 X10*3/UL (ref 0–0.1)
BASOPHILS NFR BLD AUTO: 0.7 %
BENZODIAZ UR QL SCN: NORMAL
BILIRUB SERPL-MCNC: 0.5 MG/DL (ref 0–1.2)
BUN SERPL-MCNC: 13 MG/DL (ref 6–23)
BZE UR QL SCN: NORMAL
CALCIUM SERPL-MCNC: 9.9 MG/DL (ref 8.6–10.6)
CANNABINOIDS UR QL SCN: NORMAL
CHLORIDE SERPL-SCNC: 103 MMOL/L (ref 98–107)
CO2 SERPL-SCNC: 31 MMOL/L (ref 21–32)
CREAT SERPL-MCNC: 1.1 MG/DL (ref 0.5–1.3)
EGFRCR SERPLBLD CKD-EPI 2021: 76 ML/MIN/1.73M*2
EOSINOPHIL # BLD AUTO: 0.06 X10*3/UL (ref 0–0.7)
EOSINOPHIL NFR BLD AUTO: 1 %
ERYTHROCYTE [DISTWIDTH] IN BLOOD BY AUTOMATED COUNT: 13.6 % (ref 11.5–14.5)
ETHANOL SERPL-MCNC: <10 MG/DL
FENTANYL+NORFENTANYL UR QL SCN: NORMAL
GLUCOSE SERPL-MCNC: 68 MG/DL (ref 74–99)
HCT VFR BLD AUTO: 39.8 % (ref 41–52)
HGB BLD-MCNC: 13.5 G/DL (ref 13.5–17.5)
IMM GRANULOCYTES # BLD AUTO: 0.01 X10*3/UL (ref 0–0.7)
IMM GRANULOCYTES NFR BLD AUTO: 0.2 % (ref 0–0.9)
LYMPHOCYTES # BLD AUTO: 3.2 X10*3/UL (ref 1.2–4.8)
LYMPHOCYTES NFR BLD AUTO: 52.1 %
MCH RBC QN AUTO: 30.9 PG (ref 26–34)
MCHC RBC AUTO-ENTMCNC: 33.9 G/DL (ref 32–36)
MCV RBC AUTO: 91 FL (ref 80–100)
METHADONE UR QL SCN: NORMAL
MONOCYTES # BLD AUTO: 0.64 X10*3/UL (ref 0.1–1)
MONOCYTES NFR BLD AUTO: 10.4 %
NEUTROPHILS # BLD AUTO: 2.19 X10*3/UL (ref 1.2–7.7)
NEUTROPHILS NFR BLD AUTO: 35.6 %
NRBC BLD-RTO: 0 /100 WBCS (ref 0–0)
OPIATES UR QL SCN: NORMAL
OXYCODONE+OXYMORPHONE UR QL SCN: NORMAL
PCP UR QL SCN: NORMAL
PLATELET # BLD AUTO: 179 X10*3/UL (ref 150–450)
POTASSIUM SERPL-SCNC: 3.9 MMOL/L (ref 3.5–5.3)
PROT SERPL-MCNC: 7.4 G/DL (ref 6.4–8.2)
RBC # BLD AUTO: 4.37 X10*6/UL (ref 4.5–5.9)
SALICYLATES SERPL-MCNC: <3 MG/DL
SODIUM SERPL-SCNC: 142 MMOL/L (ref 136–145)
WBC # BLD AUTO: 6.1 X10*3/UL (ref 4.4–11.3)

## 2025-03-18 NOTE — ED TRIAGE NOTES
Pt called EMS for delusional thoughts with not giving specifics stating he knows they are not real but hears them, he says he isnt sure if he is suicidal. Pt cooperative and calm.

## 2025-03-18 NOTE — ED PROVIDER NOTES
HPI   Chief Complaint   Patient presents with    Suicidal       HPI  Eleazar Burrell is a 59 y.o. male past medical history of schizophrenia hypertension hyperlipidemia presenting with hallucinations.  Patient said for the past 2 weeks he has been having hallucination and suicidal ideation.  Though he has not endorsed suicidal ideation for the past 2 days his hallucination is getting worse.  Patient said he has been compliant with his medication.  Patient does not want to harm himself or harm anybody else.  Patient said the voices tell him to go to places.  Patient denies any fever, nausea and vomiting.      Patient History   Past Medical History:   Diagnosis Date    Depression     Diabetes mellitus (Multi)     Glaucoma     Hypertension     Other conditions influencing health status 07/21/2013    Glaucoma / Intraocular Pressure    PE (pulmonary thromboembolism) (Multi)     Schizoaffective disorder (Multi)      Past Surgical History:   Procedure Laterality Date    CT ANGIO CORONARY ART WITH HEARTFLOW IF SCORE >30%  10/07/2022    CT HEART CORONARY ANGIOGRAM 10/7/2022 [facility] [place] [facility]    EYE SURGERY Bilateral      Family History   Problem Relation Name Age of Onset    Hypertension Mother      Cataracts Father      Depression Father      Asthma Sister      Seizures Sister       Social History     Tobacco Use    Smoking status: Never     Passive exposure: Never    Smokeless tobacco: Never   Vaping Use    Vaping status: Never Used   Substance Use Topics    Alcohol use: Never    Drug use: Never       Physical Exam   ED Triage Vitals [03/17/25 2254]   Temperature Heart Rate Respirations BP   36.7 °C (98 °F) 50 16 137/79      Pulse Ox Temp Source Heart Rate Source Patient Position   97 % Oral Monitor Lying      BP Location FiO2 (%)     Right arm --       Physical Exam  Constitutional:       Appearance: Normal appearance.   HENT:      Head: Normocephalic and atraumatic.   Cardiovascular:      Rate and Rhythm: Normal rate  and regular rhythm.      Pulses: Normal pulses.      Heart sounds: Normal heart sounds.   Pulmonary:      Effort: Pulmonary effort is normal.      Breath sounds: Normal breath sounds.   Abdominal:      General: Abdomen is flat. Bowel sounds are normal.      Palpations: Abdomen is soft.   Skin:     General: Skin is warm.      Capillary Refill: Capillary refill takes less than 2 seconds.   Neurological:      General: No focal deficit present.      Mental Status: He is alert and oriented to person, place, and time. Mental status is at baseline.   Psychiatric:         Mood and Affect: Mood normal.         Behavior: Behavior normal.           ED Course & MDM   Diagnoses as of 03/22/25 1248   Encounter for psychological evaluation                 No data recorded     [place] Coma Scale Score: 15 (03/18/25 0418 : Adair Bajwa RN)                           Medical Decision Making  Eleazar Burrell is a 59 y.o. male past medical history of schizophrenia hypertension hyperlipidemia presenting with hallucinations.  At bedside patient was hemodynamically stable and reserved.  Patient physical exam was unremarkable.  Due to patient endorsing hallucination, EPAT was consulted.  Patient CMP and CBC were unremarkable.  Patient drug screen was negative.  After reassessment by EPAT patient was deemed stable enough to go home.  After reassessment of patient I agree with EPAT evaluation.  Patient has a safe disposition.  Patient was then discharged.    Procedure  Procedures     Solomon Piper MD  Resident  03/18/25 1359       Alxe Fam MD  03/22/25 0349

## 2025-03-18 NOTE — PROGRESS NOTES
"EPAT - Social Work Psychiatric Assessment    Arrival Details  Mode of Arrival: Ambulance  Admission Source: home   Admission Type: Voluntary  EPAT Assessment Start Date: 03/18/25  EPAT Assessment Start Time: 0320  Name of : Marleny Kearney [facility]     History of Present Illness     Admission Reason: Evaluation    59 year old male with history of mood disorder and psychosis presenting to the Emergency Department initially reporting AH and SI which he almost immediately recanted.  He is well known to the Emergency Department and the interviewer over the last 2 decades.  Provider Note and chart history is reviewed.  The patient is known to obsess about medical issues and is seen frequently for a host of complaints.  He has a long history of requesting to be admitted to psychiatric unit where he feels more supported and cared for.  Currently he is denying SI, HI, AH and VH.  He reported that voices tell him to go places.  He reports plainly here that he knows the voices are part of his illness and he really does not follow what they say.  He is not currently hallucinating.  A lot of his recent visits indicate housing insecurity but he is currently denying that he is unhoused.        Psychiatric Symptoms  Anxiety Symptoms: Generalized   Depression Symptoms: Feelings of helplessness, depressed mood, problems with concentration  Rekha Symptoms: none reported or observed     Psychosis Symptoms  Hallucination Type: upon arrival reported AH \"telling where to go\" denies current   Delusion Type: none reported or observed    Additional Symptoms -  Generalized Anxiety Disorder: excessive worry  Obsessive Compulsive Disorder: ruminations about health issues  Panic Attack: No problems reported or observed.  Post Traumatic Stress Disorder: No problems reported or observed  Delirium: No problems reported or observed     Past Psychiatric History:   Psychiatric Diagnosis: Schizoaffective Disorder  Outpatient [facility] Center: [facility]   Psychiatric Inpatient Hospitalizations: numerous prior admissions.  Last was [facility] in 2022 but has admissions to all the local psychiatric units  Self-harm and Suicide Attempts: denies    Past Psychiatric Meds/Treatments: see medication list, he reports compliance  Past Violence/Victimization History: Pt denies, none known.     Support System: Providers  Living Arrangement: lives alone     Income Information  Income Source: disability  Current/Previous Occupation: disabled     [employer]/Education History  Current or Previous  Service: None  Education Level: high school diploma  History of Learning Problems: No   History of School Behavior Problems: No     Social/Cultural History  Social History: Born and raised locally, few family supports, high school education, disabled over 30 years with little work history, few social supports beyond providers.  Living alone     Legal  Legal Considerations: Patient/ Family Capacity to Make Sound Judgments  Assistance with Managing/Advocating Healthcare Needs:  (no LG or POA)  Criminal Activity/ Legal Involvement Pertinent to Current Situation/ Hospitalization: No legal history      Drug Screening  Have you used any substances (canabis, cocaine, heroin, hallucinogens, inhalants, etc.) in the past 12 months?: no  Have you used any prescription drugs other than prescribed in the past 12 months?: No     Behavioral Health  Behavioral Health(WDL): See narrative   Behaviors/Mood: Calm, Cooperative,     Orientation  Orientation Level: Oriented X4     General Appearance  Motor Activity: Unremarkable  Speech Pattern: Excessively soft  General Attitude: Cooperative, Guarded  Appearance/Hygiene: Unremarkable (here today without his glasses and reporting they are in the mail)     Thought Process  Coherency: organized, linear  Content: unremarkable  Delusions: none  Hallucination: denies current, initially reported AH telling him to go  places  Judgment/Insight: intact: help seeking, future oriented, able to advocate for himself  Cognition: Follows commands,        Risk Factors  Self Harm/Suicidal Ideation Plan: Pt denies;   Previous Self Harm/Suicidal Plans: denies   Risk Factors: history of mood disorder, psychosis     Violence Risk Assessment  Assessment of Violence: None noted  Thoughts of Harm to Others: No     Ability to Assess Risk Screen  Risk Screen - Ability to Assess: Able to be screened  Ask Suicide-Screening Questions  1. In the past few weeks, have you wished you were dead?: No  2. In the past few weeks, have you felt that you or your family would be better off if you were dead?: No  3. In the past week, have you been having thoughts about killing yourself?: No  4. Have you ever tried to kill yourself?: No  5. Are you having thoughts of killing yourself right now?: No  Calculated Risk Score: Low  [place] Suicide Severity Rating Scale (Screener/Recent Self-Report)  1. Wish to be Dead (Past 1 Month): No  2. Non-Specific Active Suicidal Thoughts (Past 1 Month): No  3. Active Suicidal Ideation with any Methods (Not Plan) Without Intent to Act (Past 1 Month): No  4. Active Suicidal Ideation with Some Intent to Act, Without Specific Plan (Past 1 Month): No  5. Active Suicidal Ideation with Specific Plan and Intent (Past 1 Month): No  6. Suicidal Behavior (Lifetime): No  Calculated C-SSRS Risk Score (Lifetime/Recent): Low risk   Step 1: Risk Factors  Current & Past Psychiatric Dx: Mood disorder, Psychotic disorder disorders,  Presenting Symptoms: some helplessness, problems concetrating  Family History: not assessed   Precipitants/Stressors: questionable housing insecurity, few supports  Change in Treatment: no   Access to Lethal Methods : No  Step 2: Protective Factors   Protective Factors Internal: Identifies reasons for living  Protective Factors External: some ideas that suicide is wrong  Step 3: Suicidal Ideation Intensity  How Many  "Times Have You Had These Thoughts: less than one time a week   When You Have the Thoughts How Long do They Last : fleeting   Could/Can You Stop Thinking About Killing Yourself or Wanting to Die if You Want to: not feel need to control thoughts   Are There Things - Anyone or Anything - That Stopped You From Wanting to Die or Acting on: n/a  What Sort of Reasons Did You Have For Thinking About Wanting to Die or Killing Yourself: n/a  Total Score: 2  Step 5: Documentation  Risk Level: Low risk       Psychiatric Impression and Plan of Care     Assessment and Plan: Patient called 911 reporting AH and SI.  Upon arrival to the ED is not reporting SI and indicates no current AH but voices sometimes tell him to \"go places\".  Eleazar is well known to the interviewer.  He ruminates about health issues and has history of hospital seeking behaviors.  He is seen at local emergency departments 2-3 times each week with some report of housing insecurity and enjoying being cared for by others.  Currently he is in no distress.  He is organized and denying SI, HI, AH, and VH.  The patient is compliant with medications and has been seeing providers at [facility] for years and years.  He is not endorsing symptoms of MDD or porsche.  He is encouraged to contact his Providers or to head over to [facility] if he needs to be sooner.  He declines referral to PHP or IOP.  He is recommended for discharge home.        Diagnosis: Schizoaffective Disorder by history     Outcome/Disposition: Discharge  Patient's Perception of Outcome Achieved: receptive  Assessment, Recommendations and Risk Level Reviewed with: Dr Piper  Contact Name: --  Contact Number(s): --  Contact Relationship:--  EPAT Assessment Completed Date: 03/18/25  EPAT Assessment Completed Time: 0:400                                                                                                                                                   "

## 2025-03-19 LAB — COHGB MFR BLD: 6.9 % (ref 0–3.6)

## 2025-04-21 ENCOUNTER — CLINICAL SUPPORT (OUTPATIENT)
Dept: EMERGENCY MEDICINE | Facility: HOSPITAL | Age: 60
End: 2025-04-21
Payer: MEDICARE

## 2025-04-21 ENCOUNTER — HOSPITAL ENCOUNTER (EMERGENCY)
Facility: HOSPITAL | Age: 60
Discharge: HOME | End: 2025-04-21
Attending: STUDENT IN AN ORGANIZED HEALTH CARE EDUCATION/TRAINING PROGRAM
Payer: MEDICARE

## 2025-04-21 VITALS
HEART RATE: 62 BPM | WEIGHT: 175 LBS | BODY MASS INDEX: 26.52 KG/M2 | TEMPERATURE: 98 F | RESPIRATION RATE: 18 BRPM | SYSTOLIC BLOOD PRESSURE: 128 MMHG | DIASTOLIC BLOOD PRESSURE: 78 MMHG | HEIGHT: 68 IN | OXYGEN SATURATION: 98 %

## 2025-04-21 DIAGNOSIS — F20.9 SCHIZOPHRENIA, UNSPECIFIED TYPE: Primary | ICD-10-CM

## 2025-04-21 LAB
ALBUMIN SERPL BCP-MCNC: 3.9 G/DL (ref 3.4–5)
ALP SERPL-CCNC: 54 U/L (ref 33–136)
ALT SERPL W P-5'-P-CCNC: 26 U/L (ref 10–52)
AMPHETAMINES UR QL SCN: NORMAL
ANION GAP SERPL CALC-SCNC: 14 MMOL/L (ref 10–20)
APAP SERPL-MCNC: <10 UG/ML (ref ?–30)
AST SERPL W P-5'-P-CCNC: 29 U/L (ref 9–39)
ATRIAL RATE: 45 BPM
BARBITURATES UR QL SCN: NORMAL
BASOPHILS # BLD AUTO: 0.03 X10*3/UL (ref 0–0.1)
BASOPHILS NFR BLD AUTO: 0.5 %
BENZODIAZ UR QL SCN: NORMAL
BILIRUB SERPL-MCNC: 0.4 MG/DL (ref 0–1.2)
BUN SERPL-MCNC: 26 MG/DL (ref 6–23)
BZE UR QL SCN: NORMAL
CALCIUM SERPL-MCNC: 9.5 MG/DL (ref 8.6–10.6)
CANNABINOIDS UR QL SCN: NORMAL
CARDIAC TROPONIN I PNL SERPL HS: 56 NG/L (ref 0–53)
CARDIAC TROPONIN I PNL SERPL HS: 60 NG/L (ref 0–53)
CHLORIDE SERPL-SCNC: 104 MMOL/L (ref 98–107)
CO2 SERPL-SCNC: 25 MMOL/L (ref 21–32)
CREAT SERPL-MCNC: 1.04 MG/DL (ref 0.5–1.3)
EGFRCR SERPLBLD CKD-EPI 2021: 82 ML/MIN/1.73M*2
EOSINOPHIL # BLD AUTO: 0.09 X10*3/UL (ref 0–0.7)
EOSINOPHIL NFR BLD AUTO: 1.4 %
ERYTHROCYTE [DISTWIDTH] IN BLOOD BY AUTOMATED COUNT: 13.2 % (ref 11.5–14.5)
ETHANOL SERPL-MCNC: <10 MG/DL
FENTANYL+NORFENTANYL UR QL SCN: NORMAL
GLUCOSE SERPL-MCNC: 72 MG/DL (ref 74–99)
HCT VFR BLD AUTO: 36.2 % (ref 41–52)
HGB BLD-MCNC: 12.8 G/DL (ref 13.5–17.5)
IMM GRANULOCYTES # BLD AUTO: 0.01 X10*3/UL (ref 0–0.7)
IMM GRANULOCYTES NFR BLD AUTO: 0.2 % (ref 0–0.9)
LYMPHOCYTES # BLD AUTO: 2.96 X10*3/UL (ref 1.2–4.8)
LYMPHOCYTES NFR BLD AUTO: 45.1 %
MCH RBC QN AUTO: 31 PG (ref 26–34)
MCHC RBC AUTO-ENTMCNC: 35.4 G/DL (ref 32–36)
MCV RBC AUTO: 88 FL (ref 80–100)
METHADONE UR QL SCN: NORMAL
MONOCYTES # BLD AUTO: 0.78 X10*3/UL (ref 0.1–1)
MONOCYTES NFR BLD AUTO: 11.9 %
NEUTROPHILS # BLD AUTO: 2.69 X10*3/UL (ref 1.2–7.7)
NEUTROPHILS NFR BLD AUTO: 40.9 %
NRBC BLD-RTO: 0 /100 WBCS (ref 0–0)
OPIATES UR QL SCN: NORMAL
OXYCODONE+OXYMORPHONE UR QL SCN: NORMAL
P AXIS: 41 DEGREES
P OFFSET: 206 MS
P ONSET: 150 MS
PCP UR QL SCN: NORMAL
PLATELET # BLD AUTO: 157 X10*3/UL (ref 150–450)
POTASSIUM SERPL-SCNC: 3.9 MMOL/L (ref 3.5–5.3)
PR INTERVAL: 150 MS
PROT SERPL-MCNC: 6.8 G/DL (ref 6.4–8.2)
Q ONSET: 225 MS
QRS COUNT: 7 BEATS
QRS DURATION: 92 MS
QT INTERVAL: 448 MS
QTC CALCULATION(BAZETT): 387 MS
QTC FREDERICIA: 406 MS
R AXIS: -2 DEGREES
RBC # BLD AUTO: 4.13 X10*6/UL (ref 4.5–5.9)
SALICYLATES SERPL-MCNC: <3 MG/DL (ref ?–20)
SODIUM SERPL-SCNC: 139 MMOL/L (ref 136–145)
T AXIS: -74 DEGREES
T OFFSET: 449 MS
VENTRICULAR RATE: 45 BPM
WBC # BLD AUTO: 6.6 X10*3/UL (ref 4.4–11.3)

## 2025-04-21 PROCEDURE — 85025 COMPLETE CBC W/AUTO DIFF WBC: CPT

## 2025-04-21 PROCEDURE — 80053 COMPREHEN METABOLIC PANEL: CPT

## 2025-04-21 PROCEDURE — 93010 ELECTROCARDIOGRAM REPORT: CPT | Performed by: STUDENT IN AN ORGANIZED HEALTH CARE EDUCATION/TRAINING PROGRAM

## 2025-04-21 PROCEDURE — 84484 ASSAY OF TROPONIN QUANT: CPT

## 2025-04-21 PROCEDURE — 99284 EMERGENCY DEPT VISIT MOD MDM: CPT | Performed by: STUDENT IN AN ORGANIZED HEALTH CARE EDUCATION/TRAINING PROGRAM

## 2025-04-21 PROCEDURE — 36415 COLL VENOUS BLD VENIPUNCTURE: CPT

## 2025-04-21 PROCEDURE — 2500000001 HC RX 250 WO HCPCS SELF ADMINISTERED DRUGS (ALT 637 FOR MEDICARE OP)

## 2025-04-21 PROCEDURE — 99285 EMERGENCY DEPT VISIT HI MDM: CPT | Performed by: STUDENT IN AN ORGANIZED HEALTH CARE EDUCATION/TRAINING PROGRAM

## 2025-04-21 PROCEDURE — 93005 ELECTROCARDIOGRAM TRACING: CPT

## 2025-04-21 PROCEDURE — 99285 EMERGENCY DEPT VISIT HI MDM: CPT

## 2025-04-21 PROCEDURE — 80307 DRUG TEST PRSMV CHEM ANLYZR: CPT

## 2025-04-21 PROCEDURE — 80320 DRUG SCREEN QUANTALCOHOLS: CPT

## 2025-04-21 RX ORDER — HALOPERIDOL 1 MG/1
1 TABLET ORAL ONCE
Status: COMPLETED | OUTPATIENT
Start: 2025-04-21 | End: 2025-04-21

## 2025-04-21 RX ADMIN — HALOPERIDOL 1 MG: 1 TABLET ORAL at 05:10

## 2025-04-21 SDOH — HEALTH STABILITY: MENTAL HEALTH: NON-SPECIFIC ACTIVE SUICIDAL THOUGHTS (PAST 1 MONTH): NO

## 2025-04-21 SDOH — HEALTH STABILITY: MENTAL HEALTH: ANXIETY SYMPTOMS: GENERALIZED

## 2025-04-21 SDOH — HEALTH STABILITY: MENTAL HEALTH: WISH TO BE DEAD (PAST 1 MONTH): NO

## 2025-04-21 SDOH — ECONOMIC STABILITY: GENERAL

## 2025-04-21 SDOH — HEALTH STABILITY: MENTAL HEALTH: ARE YOU HAVING THOUGHTS OF KILLING YOURSELF RIGHT NOW?: NO

## 2025-04-21 SDOH — HEALTH STABILITY: MENTAL HEALTH: HAVE YOU EVER TRIED TO KILL YOURSELF?: NO

## 2025-04-21 SDOH — HEALTH STABILITY: MENTAL HEALTH: SUICIDAL BEHAVIOR (LIFETIME): NO

## 2025-04-21 SDOH — HEALTH STABILITY: MENTAL HEALTH: DEPRESSION SYMPTOMS: IMPAIRED CONCENTRATION;ISOLATIVE

## 2025-04-21 SDOH — HEALTH STABILITY: MENTAL HEALTH: IN THE PAST FEW WEEKS, HAVE YOU FELT THAT YOU OR YOUR FAMILY WOULD BE BETTER OFF IF YOU WERE DEAD?: NO

## 2025-04-21 SDOH — HEALTH STABILITY: MENTAL HEALTH: IN THE PAST WEEK, HAVE YOU BEEN HAVING THOUGHTS ABOUT KILLING YOURSELF?: NO

## 2025-04-21 SDOH — ECONOMIC STABILITY: HOUSING INSECURITY: FEELS SAFE LIVING IN HOME: YES

## 2025-04-21 SDOH — HEALTH STABILITY: MENTAL HEALTH: IN THE PAST FEW WEEKS, HAVE YOU WISHED YOU WERE DEAD?: NO

## 2025-04-21 ASSESSMENT — PAIN - FUNCTIONAL ASSESSMENT: PAIN_FUNCTIONAL_ASSESSMENT: 0-10

## 2025-04-21 ASSESSMENT — LIFESTYLE VARIABLES
PRESCIPTION_ABUSE_PAST_12_MONTHS: NO
SUBSTANCE_ABUSE_PAST_12_MONTHS: NO

## 2025-04-21 ASSESSMENT — PAIN DESCRIPTION - PROGRESSION: CLINICAL_PROGRESSION: NOT CHANGED

## 2025-04-21 ASSESSMENT — PAIN SCALES - GENERAL: PAINLEVEL_OUTOF10: 0 - NO PAIN

## 2025-04-21 NOTE — ED TRIAGE NOTES
"Patient to ED for c/o auditory hallucinations. Per patient, voices are \"telling him negative stories.\" Denies SI/HI/VH. Denies command hallucinations. Denies pain and physical injury. Denies ETOH and drug use. Patient states he took his evening medications, and the voices \"got worse\" prompting him to come in. A+Ox4, NAD, VSS  "

## 2025-04-21 NOTE — PROGRESS NOTES
"EPAT - Social Work Psychiatric Assessment    Arrival Details  Mode of Arrival: Ambulatory  Admission Source: Home  Admission Type: Voluntary  EPAT Assessment Start Date: 04/21/25  EPAT Assessment Start Time: 0430  Name of : Marleny Burgos [facility]    History of Present Illness  Admission Reason: Psychiatric Evaluation  HPI: Patient, Eleazar Burrell, is a 59 year old male with history of schizoaffective disorder bipolar type. Patient presented to ED with complaint of psychiatric evaluation. Patient reporting increased distress related to auditory hallucinations. Patient reportedly noticing an increase in halucination intensity and brought self to ED. No report of suicidal ideation or homicidal ideation noted in charting. EPAT consulted due to concern for psychiatric decompensation. Patient's chart, community record, provider note, triage note, labs, and C-SSRS score reviewed. Patient's chart shows history of mental health diagnoses, psychiatric evaluations, and inpatient psychiatric hospitalizations. No recent EPAT assessments noted in charting. Patient's C-SSRS scored at \"no risk\" in triage. Patient declined collateral contact during assessment.    SW Readmission Information   Readmission within 30 Days: Yes  Previous ED Visit Date and Reason : 03/24/2025, Psychiatric evaluation  Previous Discharge Date and Location: 03/28/2025, [facility]  Factors Contributing to  Readmission Inpatient/ED (Team Perspective): Med Compliance/Difficulty Obtaining  Readmission Factors Team Comments: Chronic mental health diagnoses.  Factors Contributing to Readmission (Patient/Family Perspective): Chronic mental health diagnoses.    Psychiatric Symptoms  Anxiety Symptoms: Generalized  Depression Symptoms: Impaired concentration, Isolative  Rekha Symptoms: No problems reported or observed.    Psychosis Symptoms  Hallucination Type: Auditory  Delusion Type: No problems reported or observed.    Additional Symptoms - " Adult  Generalized Anxiety Disorder: Excessive anxiety/worry, Restlessness  Obsessive Compulsive Disorder: No problems reported or observed.  Panic Attack: No problems reported or observed.  Post Traumatic Stress Disorder: No problems reported or observed.  Delirium: No problems reported or observed.  Review of Symptoms Comments: Patient did not discuss recent changes to appetite, mood, sleeping, or other ADLs recently. Patient reported some increased anxiety regarding medication directions. Patient reported increased distress related to hallucinations. Patient reported hallucinations were telling patient negative things. Patient reportedly distressed by hallucinations telling patient, patient was  to patient's family member and having romantic relationship with them. Patient denied commanding hallucinations with ED provider. Patient denied active suicidal ideation and history of attempts. Patient denied homicidal ideation.    Past Psychiatric History/Meds/Treatments  Past Psychiatric History: Patient has history of schizoaffective disorder bipolar type.  Past Psychiatric Meds/Treatments: Patient reportedly using [facility] withlast does given on 04/09/2025. Additional medications noted in charting.  Past Violence/Victimization History: Unreported    Current Mental Health Contacts   Name/Phone Number: Patient working on connection with CM at [facility].   Last Appointment Date: 04/09/2025  Provider Name/Phone Number: With [facility]  Provider Last Appointment Date: 04/09/2025    Support System: Community    Living Arrangement: Apartment    Home Safety  Feels Safe Living in Home: Yes  Potentially Unsafe Housing Conditions: Unable to Assess  Home Safety : Patient reported feeling safe at home but is working with CM to get into more suitable housing.    Income Information  Employment Status for: Patient  Employment Status: Disabled  Income Source:  Disability  Current/Previous Occupation: Unable to Assess  Income/Expense Information: Income meets expenses  Financial Concerns: None  Who Manages Finances if Patient Unable: Unreported  Employment/ Finance Comments: Patient reportedly using SSI income for expenses.    [place] Service/Education History  Current or Previous  Service: None   Experience: Other (Comment) (Unreported)  Education Level: Other (Comment) (Did not assess)  History of Learning Problems: No  History of School Behavior Problems: No  School History: Patient did not discuss school history or learning issues.    Social/Cultural History  Social History: Patient is a 59 year old black male with brown skin, brown hair, wearing hospital gear. Patient appeared appropriately groomed and close to stated age.  Cultural Requests During Hospitalization: Unreported  Spiritual Requests During Hospitalization: Unreported  Important Activities: Social    Legal  Legal Considerations: Patient/ Family Ability to Make Healthcare Decisions  Assistance with Managing/Advocating Healthcare Needs: Other (Comment) (Unreported)  Criminal Activity/ Legal Involvement Pertinent to Current Situation/ Hospitalization: Unreported  Legal Concerns: Unreported  Legal Comments: Unreported    Drug Screening  Have you used any substances (canabis, cocaine, heroin, hallucinogens, inhalants, etc.) in the past 12 months?: No  Have you used any prescription drugs other than prescribed in the past 12 months?: No  Is a toxicology screen needed?: Yes    Stage of Change  Stage of Change: Precontemplation  History of Treatment: Other (Comment) (Unreported)  Type of Treatment Offered: AA/NA meeting resource  Treatment Offered: Declined  Duration of Substance Use: Unreported  Frequency of Substance Use: Unreported  Age of First Substance Use: Unreported    Behavioral Health  Behavioral Health(WDL): Exceptions to WDL  Behaviors/Mood: Anxious, Calm, Cooperative  Affect:  Appropriate to circumstances  Parent/Guardian/Significant Other Involvement: No involvement  Family Behaviors: Unable to assess  Visitor Behaviors: Unable to assess  Needs Expressed: Emotional  Emotional Support Given: Reassure    Orientation  Orientation Level: Oriented X4    General Appearance  Motor Activity: Unremarkable  Speech Pattern: Excessively soft  General Attitude: Cooperative, Pleasant  Appearance/Hygiene: Unremarkable    Thought Process  Coherency: [place] thinking, Circumstantial  Content: Unremarkable  Delusions: Controlled  Perception: Not altered  Hallucination: Auditory  Judgment/Insight: Other (Comment) (Fair)  Confusion: None  Cognition: Appropriate judgement, Appropriate safety awareness    Sleep Pattern  Sleep Pattern: Sleeps all night    Risk Factors  Self Harm/Suicidal Ideation Plan: Patient denying active suicidal ideation and history of attempts.  Previous Self Harm/Suicidal Plans: Unreported  Risk Factors: Male, Major mental illness  Description of Thoughts/Ideas Leaving Unit Now: Patient denying active suicidal ideation and reported feeling safe at home.    Violence Risk Assessment  Assessment of Violence: None noted  Thoughts of Harm to Others: No    Ability to Assess Risk Screen  Risk Screen - Ability to Assess: Able to be screened  Ask Suicide-Screening Questions  1. In the past few weeks, have you wished you were dead?: No  2. In the past few weeks, have you felt that you or your family would be better off if you were dead?: No  3. In the past week, have you been having thoughts about killing yourself?: No  4. Have you ever tried to kill yourself?: No  5. Are you having thoughts of killing yourself right now?: No  Calculated Risk Score: No intervention is necessary  [place] Suicide Severity Rating Scale (Screener/Recent Self-Report)  1. Wish to be Dead (Past 1 Month): No  2. Non-Specific Active Suicidal Thoughts (Past 1 Month): No  6. Suicidal Behavior (Lifetime): No  Calculated  "C-SSRS Risk Score (Lifetime/Recent): No Risk Indicated  Step 1: Risk Factors  Current & Past Psychiatric Dx: Psychotic disorder  Presenting Symptoms: Psychosis  Family History: Other (Comment) (Unreported)  Precipitants/Stressors: Triggering events leading to humiliation, shame, and/or despair (e.g. loss of relationship, financial or health status) (real or anticipated)  Change in Treatment: Recent inpatient discharge  Access to Lethal Methods : No  Step 2: Protective Factors   Protective Factors Internal: Ability to cope with stress, Identifies reasons for living, Frustration tolerance  Protective Factors External: Positive therapeutic relationships, Supportive social network or family or friends  Step 3: Suicidal Ideation Intensity  Most Severe Suicidal Ideation Identified: Patient denied active suicidal ideation and history of attempts.  How Many Times Have You Had These Thoughts: Less than once a week  When You Have the Thoughts How Long do They Last : Fleeting - few seconds or minutes  Could/Can You Stop Thinking About Killing Yourself or Wanting to Die if You Want to: Easily able to control thoughts  Are There Things - Anyone or Anything - That Stopped You From Wanting to Die or Acting on: Deterrents definitely stopped you from attempting suicide  What Sort of Reasons Did You Have For Thinking About Wanting to Die or Killing Yourself: Does not apply  Total Score: 4  Step 5: Documentation  Risk Level: Low suicide risk    Psychiatric Impression and Plan of Care  Assessment and Plan: Patient, Eleazar Burrell, is a 59 year old male with history of schizoaffective disorder bipolar type. Patient presented to ED with complaint of psychiatric evaluation. Patient reportedly distressed by worseing auditory hallucinations. Patient discussed reason for ED visit stating \"I have a sore head. My chest hurts.\" Patient seemed to focus on somatic complaints initially with EPAT . Patient asked about complaint of " "hallucinations and patient reported the hallucinations were \"bad\" prior to ED arrival. Patient reported hallucinations had decreased in the ED and were no longer distressing to patient. Patient denied hallucinations were commanding in any way. Patient did not appear internally stimulated during assessment. Patient denied active suicidal ideation and history of attempts. Patient's C-SSRS scored at low risk due to no active ideation and no history of attempts reported. Patient denied homicidal ideation. Patient denied any acute changes to appetite, sleeping, mood, or other ADLs in recent days. Patient denied recent substance use and did not require sober support resources from [facility]. Patient reported having positive connections with [facility] for case and medication management. Patient reported having upcoming appointment on 05/07/2025 with request put into the [employer] [facility]nis for sooner appointment. Patient reported  is helping patient with housing options due to increased distress with living conditions. Patient identified reason for living being going to appointments and getting food delivery. Patient appeared calm, cooperative, future, and goal oriented. Patient appears to be at or above well documented baseline currently and does not meet criteria for inpatient psychiatric hospitalization. Patient appears to be low risk of harm to self, low risk of harm to others, and not experiencing any acute disability related to mental health functioning. Patient recommended to follow up with current providers, call 9-1-1, call crisis hotline, and return to ED if symptoms worsen. Plan for care discussed with and approved by Dr. Myles.  Specific Resources Provided to Patient: Patient encouarged to follow up with current providers, call 9-1-1, call crisis hotline, and return to ED if symptoms worsen.  CM Notified: -  PHP/IOP Recommended: Not at this time  Specific Information Provided for PHP/IOP: None at this " time  Plan Comments: Diagnosis: Schizoaffective disorder    I have had a discussion with the patient about warning signs that their condition is worsening, and they should consider returning to the ED and/or calling 911    The patient has identified appropriate internal coping strategies and has people and social settings that provide distraction and support.    The patient has a person who they can talk to in a crisis.  I have offered to contact this individual  No - patient has declined that I reach out.     Outcome/Disposition  Patient's Perception of Outcome Achieved: Accepting, patient requesting to go home.  Assessment, Recommendations and Risk Level Reviewed with: Dr. Myles  Contact Name: Iftikhar Ruggiero Jr  Contact Number(s): [phone]  Contact Relationship: Friend  EPAT Assessment Completed Date: 04/21/25  EPAT Assessment Completed Time: 0729  Patient Disposition: Home

## 2025-04-21 NOTE — ED PROVIDER NOTES
History of Present Illness     History provided by: Patient  Limitations to History: None Identified  External Records Reviewed with Brief Summary: Previous ED visits/recent PCP notes for PMH     HPI:  Eleazar Burrell is a 59 y.o. male with history of schizoaffective disorder who presents for evaluation of auditory hallucinations.  Patient states he has been taking his medications though they have not been helping him.  He is hearing worsening voices that are telling him negative thoughts and stories.  He denies any suicidal or homicidal ideation.  But these voices are interfering with his activities of daily living.  Patient states that he has been taking his Haldol as needed.  This does help and he feels like he needs better control of the voices.    Physical Exam   Triage vitals:  T 36.7 °C (98 °F)  HR 60  /80  RR 16  O2 99 % None (Room air)    General: Awake, alert, in no acute distress  Eyes: Gaze conjugate.  No scleral icterus or injection  HENT: Normo-cephalic, atraumatic. No stridor  CV: RRR. Radial/PT pulses 2+ bilaterally  Resp: Breathing non-labored, speaking in full sentences.  Clear to auscultation bilaterally  GI: Soft, non-distended, non-tender. No rebound or guarding.  : Deferred  MSK/Extremities: No gross bony deformities. Moving all extremities  Skin: Warm. Appropriate color  Neuro: Alert. Oriented. Face symmetric. Speech is fluent.  Gross strength and sensation intact in b/l UE and LEs  Psych: Some internal stimulation, endorses auditory hallucinations no visual hallucinations no suicidal or homicidal ideation      Medical Decision Making & ED Course   Medical Decision Making:  MDM:    Eleazar Burrell is a 59 y.o. male with a past medical history of schizoaffective disorder bipolar type  who presents for psychiatric evaluation. Pt was endorsing worsening auditory hallucinations that are telling him to do bad things. He has been taking his medication, but doesn't have . Low concern  for medical etiology including infection, metabolic, dementia, delirium, or drug induced psychosis. Medical clearance labs and EKG were obtained and unremarkable. Patient was medically cleared for psychiatric evaluation. EPAT was consulted.     After evaluation by EPAT they not believe the patient meets inpatient criteria, patient does not appear to be a harm to himself, does have forward thinking and outpatient follow-up.  He was counseled on how to take his Haldol and was encouraged to follow-up with his outpatient provider.  He does have ointment scheduled for close outpatient follow-up.  Patient was given strict return precautions and discharged in stable condition.  ----     Social Determinants of Health which Significantly Impact Care: Mental health disorder The following actions were taken to address these social determinants: Patient given list of psychiatric resources    EKG Independent Interpretation: EKG interpreted by myself. Please see ED Course and [facility] for full interpretation.    Independent Result Review and Interpretation: Results were independently reviewed and interpreted by myself. Please see ED course and [facility] for full interpretation.    Chronic conditions affecting the patient's care: As documented in the [facility]    Care Considerations: As per [facility]    ED Course:  ED Course as of 04/25/25 1804 Mon Apr 21, 2025   0539 Electrocardiogram, 12-lead  EKG sinus bradycardia with rate of 45 intervals within normal limits no acute ST segment [SC]      ED Course User Index  [SC] Azra Myles DO         Diagnoses as of 04/25/25 1804   Schizophrenia, unspecified type     Disposition   Patient discharged with outpatient follow-up    Procedures   Procedures    Patient seen and discussed with ED attending physician.    Azra Myles DO  PGY-3 Emergency Medicine    ---------------------------------------------------------    ATTENDING NOTE for Brant Donald MD:    ATTENDING ATTESTATION:  The patient was seen by  the resident/fellow.  I have personally performed a substantive portion of the encounter.  I have seen and examined the patient; agree with the workup, evaluation, MDM, management and diagnosis.  The care plan has been discussed with the resident/fellow; I have reviewed the resident/fellow´s note and agree with the documented findings with the exception/addition of the followin-year-old with history of schizophrenia who reports he takes Haldol intermittently who presents with worsening hallucinations as well as reporting increased levels of paranoia.  Patient has some disorganized speech and thinking when I was assessing him and I worry that he is having worsening of his schizophrenia which might be from medication noncompliance.  Blood work EKG and urine will be obtained for medical screening purposes and EPAT was requested to assess the patient to help with final disposition.    I have looked at the EKG and agree with the resident interpretation.    ---------------------------------------------------------     Azra Myles,   Resident  25 1804

## 2025-04-21 NOTE — DISCHARGE INSTRUCTIONS
TAKE YOUR HALDOL 5mg TWO TIMES A DAY  TAKE YOUR HALDOL 1mg THREE TIMES A DAY    PLEASE FOLLOW UP WITH [facility]

## 2025-05-17 ENCOUNTER — CLINICAL SUPPORT (OUTPATIENT)
Dept: EMERGENCY MEDICINE | Facility: HOSPITAL | Age: 60
End: 2025-05-17
Payer: MEDICARE

## 2025-05-17 ENCOUNTER — APPOINTMENT (OUTPATIENT)
Dept: RADIOLOGY | Facility: HOSPITAL | Age: 60
End: 2025-05-17
Payer: MEDICARE

## 2025-05-17 ENCOUNTER — HOSPITAL ENCOUNTER (EMERGENCY)
Facility: HOSPITAL | Age: 60
Discharge: HOME | End: 2025-05-17
Attending: EMERGENCY MEDICINE
Payer: MEDICARE

## 2025-05-17 VITALS
DIASTOLIC BLOOD PRESSURE: 67 MMHG | OXYGEN SATURATION: 100 % | HEIGHT: 68 IN | WEIGHT: 178 LBS | BODY MASS INDEX: 26.98 KG/M2 | HEART RATE: 58 BPM | TEMPERATURE: 97.9 F | SYSTOLIC BLOOD PRESSURE: 109 MMHG | RESPIRATION RATE: 16 BRPM

## 2025-05-17 DIAGNOSIS — R79.89 ELEVATED TROPONIN I LEVEL: ICD-10-CM

## 2025-05-17 DIAGNOSIS — R07.89 OTHER CHEST PAIN: Primary | ICD-10-CM

## 2025-05-17 LAB
ALBUMIN SERPL BCP-MCNC: 3.8 G/DL (ref 3.4–5)
ALP SERPL-CCNC: 62 U/L (ref 33–136)
ALT SERPL W P-5'-P-CCNC: 16 U/L (ref 10–52)
ANION GAP BLDV CALCULATED.4IONS-SCNC: 6 MMOL/L (ref 10–25)
ANION GAP SERPL CALC-SCNC: 12 MMOL/L (ref 10–20)
AST SERPL W P-5'-P-CCNC: 24 U/L (ref 9–39)
BASE EXCESS BLDV CALC-SCNC: 5.2 MMOL/L (ref -2–3)
BASOPHILS # BLD AUTO: 0.02 X10*3/UL (ref 0–0.1)
BASOPHILS NFR BLD AUTO: 0.3 %
BILIRUB SERPL-MCNC: 0.4 MG/DL (ref 0–1.2)
BODY TEMPERATURE: 37 DEGREES CELSIUS
BUN SERPL-MCNC: 19 MG/DL (ref 6–23)
CA-I BLDV-SCNC: 1.28 MMOL/L (ref 1.1–1.33)
CALCIUM SERPL-MCNC: 9.1 MG/DL (ref 8.6–10.6)
CARDIAC TROPONIN I PNL SERPL HS: 48 NG/L (ref 0–53)
CARDIAC TROPONIN I PNL SERPL HS: 67 NG/L (ref 0–53)
CHLORIDE BLDV-SCNC: 105 MMOL/L (ref 98–107)
CHLORIDE SERPL-SCNC: 104 MMOL/L (ref 98–107)
CO2 SERPL-SCNC: 27 MMOL/L (ref 21–32)
CREAT SERPL-MCNC: 1.12 MG/DL (ref 0.5–1.3)
EGFRCR SERPLBLD CKD-EPI 2021: 75 ML/MIN/1.73M*2
EOSINOPHIL # BLD AUTO: 0.07 X10*3/UL (ref 0–0.7)
EOSINOPHIL NFR BLD AUTO: 1.2 %
ERYTHROCYTE [DISTWIDTH] IN BLOOD BY AUTOMATED COUNT: 13.1 % (ref 11.5–14.5)
GLUCOSE BLDV-MCNC: 83 MG/DL (ref 74–99)
GLUCOSE SERPL-MCNC: 84 MG/DL (ref 74–99)
HCO3 BLDV-SCNC: 30.5 MMOL/L (ref 22–26)
HCT VFR BLD AUTO: 38.5 % (ref 41–52)
HCT VFR BLD EST: 42 % (ref 41–52)
HGB BLD-MCNC: 13.2 G/DL (ref 13.5–17.5)
HGB BLDV-MCNC: 14.1 G/DL (ref 13.5–17.5)
IMM GRANULOCYTES # BLD AUTO: 0.01 X10*3/UL (ref 0–0.7)
IMM GRANULOCYTES NFR BLD AUTO: 0.2 % (ref 0–0.9)
INR PPP: 1.2 (ref 0.9–1.1)
LACTATE BLDV-SCNC: 1.4 MMOL/L (ref 0.4–2)
LYMPHOCYTES # BLD AUTO: 2.86 X10*3/UL (ref 1.2–4.8)
LYMPHOCYTES NFR BLD AUTO: 49.3 %
MCH RBC QN AUTO: 30.7 PG (ref 26–34)
MCHC RBC AUTO-ENTMCNC: 34.3 G/DL (ref 32–36)
MCV RBC AUTO: 90 FL (ref 80–100)
MONOCYTES # BLD AUTO: 0.69 X10*3/UL (ref 0.1–1)
MONOCYTES NFR BLD AUTO: 11.9 %
NEUTROPHILS # BLD AUTO: 2.15 X10*3/UL (ref 1.2–7.7)
NEUTROPHILS NFR BLD AUTO: 37.1 %
NRBC BLD-RTO: 0 /100 WBCS (ref 0–0)
OXYHGB MFR BLDV: 90 % (ref 45–75)
PCO2 BLDV: 46 MM HG (ref 41–51)
PH BLDV: 7.43 PH (ref 7.33–7.43)
PLATELET # BLD AUTO: 151 X10*3/UL (ref 150–450)
PO2 BLDV: 67 MM HG (ref 35–45)
POTASSIUM BLDV-SCNC: 3.9 MMOL/L (ref 3.5–5.3)
POTASSIUM SERPL-SCNC: 3.8 MMOL/L (ref 3.5–5.3)
PROT SERPL-MCNC: 6.6 G/DL (ref 6.4–8.2)
PROTHROMBIN TIME: 13 SECONDS (ref 9.8–12.4)
RBC # BLD AUTO: 4.3 X10*6/UL (ref 4.5–5.9)
SAO2 % BLDV: 92 % (ref 45–75)
SODIUM BLDV-SCNC: 138 MMOL/L (ref 136–145)
SODIUM SERPL-SCNC: 139 MMOL/L (ref 136–145)
WBC # BLD AUTO: 5.8 X10*3/UL (ref 4.4–11.3)

## 2025-05-17 PROCEDURE — 99285 EMERGENCY DEPT VISIT HI MDM: CPT | Performed by: EMERGENCY MEDICINE

## 2025-05-17 PROCEDURE — 84484 ASSAY OF TROPONIN QUANT: CPT | Performed by: EMERGENCY MEDICINE

## 2025-05-17 PROCEDURE — 85025 COMPLETE CBC W/AUTO DIFF WBC: CPT | Performed by: EMERGENCY MEDICINE

## 2025-05-17 PROCEDURE — 84132 ASSAY OF SERUM POTASSIUM: CPT

## 2025-05-17 PROCEDURE — 85610 PROTHROMBIN TIME: CPT | Performed by: EMERGENCY MEDICINE

## 2025-05-17 PROCEDURE — 71045 X-RAY EXAM CHEST 1 VIEW: CPT | Performed by: SURGERY

## 2025-05-17 PROCEDURE — 99285 EMERGENCY DEPT VISIT HI MDM: CPT | Mod: 25 | Performed by: EMERGENCY MEDICINE

## 2025-05-17 PROCEDURE — 93005 ELECTROCARDIOGRAM TRACING: CPT

## 2025-05-17 PROCEDURE — 36415 COLL VENOUS BLD VENIPUNCTURE: CPT | Performed by: EMERGENCY MEDICINE

## 2025-05-17 PROCEDURE — 84132 ASSAY OF SERUM POTASSIUM: CPT | Mod: 59 | Performed by: EMERGENCY MEDICINE

## 2025-05-17 PROCEDURE — 71045 X-RAY EXAM CHEST 1 VIEW: CPT

## 2025-05-17 ASSESSMENT — LIFESTYLE VARIABLES
TOTAL SCORE: 0
HAVE YOU EVER FELT YOU SHOULD CUT DOWN ON YOUR DRINKING: NO
HAVE PEOPLE ANNOYED YOU BY CRITICIZING YOUR DRINKING: NO
EVER HAD A DRINK FIRST THING IN THE MORNING TO STEADY YOUR NERVES TO GET RID OF A HANGOVER: NO
EVER FELT BAD OR GUILTY ABOUT YOUR DRINKING: NO

## 2025-05-17 ASSESSMENT — PAIN SCALES - GENERAL
PAINLEVEL_OUTOF10: 0 - NO PAIN
PAINLEVEL_OUTOF10: 4

## 2025-05-17 ASSESSMENT — PAIN - FUNCTIONAL ASSESSMENT: PAIN_FUNCTIONAL_ASSESSMENT: 0-10

## 2025-05-17 ASSESSMENT — PAIN DESCRIPTION - PROGRESSION: CLINICAL_PROGRESSION: NOT CHANGED

## 2025-05-17 ASSESSMENT — PAIN DESCRIPTION - LOCATION: LOCATION: CHEST

## 2025-05-17 ASSESSMENT — PAIN DESCRIPTION - DESCRIPTORS: DESCRIPTORS: PRESSURE

## 2025-05-17 NOTE — DISCHARGE INSTRUCTIONS
You were seen today for chest pain.  We got troponin test, which did not show any changes from your baseline.  Additionally, we got EKGs, and that these were not any different from your previous EKGs.  Given this, it is unlikely that this is secondary to your heart.  However, you do have risk factors including your age as well as some of your other medical problems.  Given this, you should follow-up with your cardiologist.  He may want to get additional tests, including a repeat stress test.  I would call him on Monday to schedule risk stratification.

## 2025-05-17 NOTE — ED PROVIDER NOTES
History of Present Illness     History provided by: Patient  Limitations to History: None  External Records Reviewed with Brief Summary: Multiple previous ED discharge summaries both from the [facility] and [facility], inpatient admission 3/25 to 3/28/2025 for behavioral health    HPI:  Eleazar Burrell is a 59 y.o. male with past medical history of schizophrenia, hypertension, hyperlipidemia who presents today for chest pain.  Patient endorses that he had substernal chest pain that lasted for roughly 1 day, however, he is no longer experiencing chest pain. His chest pain was not associated with any exertion or diaphoresis. He denies any shortness of breath. Denies any nausea, vomiting diarrhea, or abdominal pain. No trauma, falls, or injuries. No passing out. He denies any headache, dizziness, vision changes, neck pain, back pain, urinary symptoms, numbness, weakness, or tingling his arms or legs. He denies any leg swelling or leg pain. No fevers or chills.  He does endorse that he is previously had a stress test roughly 3 years ago.  Denies any previous history of cardiac disease. Patient denies any suicidal or homicidal ideation. He denies any hallucinations. No sick contacts or recent travels.     Physical Exam   Triage vitals:  T 36.6 °C (97.9 °F)  HR 77  /77  RR 17  O2 96 % None (Room air)    Physical Exam  Vitals and nursing note reviewed.   Constitutional:       General: He is not in acute distress.     Appearance: Normal appearance. He is not ill-appearing or diaphoretic.      Comments: Resting comfortably.    HENT:      Head: Normocephalic and atraumatic.      Mouth/Throat:      Mouth: Mucous membranes are moist.      Pharynx: No oropharyngeal exudate or posterior oropharyngeal erythema.   Eyes:      General: No scleral icterus.     Extraocular Movements: Extraocular movements intact.      Pupils: Pupils are equal, round, and reactive to light.   Cardiovascular:      Rate and Rhythm: Normal rate and regular  rhythm.      Pulses: Normal pulses.           Radial pulses are 2+ on the right side and 2+ on the left side.        Dorsalis pedis pulses are 2+ on the right side and 2+ on the left side.        Posterior tibial pulses are 2+ on the right side.      Heart sounds: Normal heart sounds. No murmur heard.     No gallop.   Pulmonary:      Effort: Pulmonary effort is normal. No respiratory distress.      Breath sounds: Normal breath sounds. No stridor. No decreased breath sounds, wheezing, rhonchi or rales.   Chest:      Chest wall: No mass or tenderness.   Abdominal:      General: Bowel sounds are normal. There is no distension.      Palpations: Abdomen is soft. There is no mass.      Tenderness: There is no abdominal tenderness. There is no guarding or rebound.      Hernia: No hernia is present.   Musculoskeletal:         General: No swelling, deformity or signs of injury. Normal range of motion.      Cervical back: Normal range of motion and neck supple. No tenderness.      Right lower leg: No tenderness. No edema.      Left lower leg: No tenderness. No edema.   Skin:     General: Skin is warm.      Capillary Refill: Capillary refill takes less than 2 seconds.      Findings: No erythema, lesion or rash.   Neurological:      General: No focal deficit present.      Mental Status: He is alert and oriented to person, place, and time.      Cranial Nerves: No cranial nerve deficit.      Sensory: No sensory deficit.      Motor: No weakness.   Psychiatric:         Mood and Affect: Mood normal.         Behavior: Behavior normal.      Comments: Denies suicidal or homicidal ideation. Denies hallucinations.           Medical Decision Making & ED Course   Medical Decision Makin y.o. male with a past medical history of schizophrenia, hypertension, hyperlipidemia who presents today for an episode of chest pain. In the Emergency Department, hospital records were reviewed and IV access was obtained.  The patient is afebrile with  stable vital signs. The patient is in no respiratory distress, satting well on room air. No tachycardia or hypoxia. Given the fact the patient is chest pain-free at the moment, my concern for this being cardiac in etiology is relatively low.  However, he does have some risk factors, so we will perform cardiac workup. Patient denies any current chest pain. He denies any shortness of breath, abdominal pain, back pain, leg pain or leg swelling. Chest xray was obtained and independently reviewed and showed no acute processes. CBC showed no leukocytosis. Hemoglobin is 13.2. CMP was unremarkable. Initial troponin was 67 and repeat delta troponin was downtrending to 48. Per review of EMR, the patient has baseline troponins in the mid 50s to low 60s.  Given this and given that the patient remains chest pain free, it is felt that the patient was appropriate for follow-up outpatient. Patient's EKG appears to be at baseline with T wave inversions in V 4 through V6, however, that is not new.  I did discuss the patient's results with him. Shared decision making was had with the patient. The patient states that he feels comfortable going home with outpatient follow-up with his cardiologist, Dr. Almonte.  All questions were answered prior to discharge. The patient was instructed of supportive measures and to follow-up with cardiology and a primary care physician. Strict return precautions were provided, for which the patient expressed understanding. The patient was discharged home in stable condition. They should feel free to return to the Emergency Department at any time should their condition worsen or should they have any questions or concerns.   ----      Differential diagnoses considered include but are not limited to: Chronic troponinemia, noncardiac chest pain, ACS, electrolyte abnormality, infection, musculoskeletal pain     Social Determinants of Health which Significantly Impact Care: None identified     EKG Independent  Interpretation: EKG interpreted by myself. Please see ED Course for full interpretation.    Independent Result Review and Interpretation: Relevant laboratory and radiographic results were reviewed and independently interpreted by myself.  As necessary, they are commented on in the ED Course.    Chronic conditions affecting the patient's care: As documented above in [facility]    The patient was discussed with the following consultants/services: None    Care Considerations: As documented above in [facility]    ED Course:  ED Course as of 05/17/25 0634   Sat May 17, 2025   0632 EKG demonstrates sinus bradycardia with a rate of 45, normal SD QRS and QTc intervals.  No evidence of ST elevations or ST depressions, T wave inversions in the lateral leads, similar to previous EKGs, most recently 3/17/2025 [IB]      ED Course User Index  [IB] Bogdan Heard MD         Diagnoses as of 05/17/25 0634   Other chest pain   Elevated troponin I level     Disposition   As a result of the work-up, the patient was discharged home.  he was informed of his diagnosis and instructed to come back with any concerns or worsening of condition.  he and was agreeable to the plan as discussed above.  he was given the opportunity to ask questions.  All of the patient's questions were answered.    Procedures   Procedures    Patient seen and discussed with ED attending physician.    Bogdan Heard MD  Emergency Medicine       Bogdan Heard MD  Resident  05/17/25 0634    I saw and evaluated the patient. I personally obtained the key and critical portions of the history and physical exam or was physically present for key and critical portions performed by the resident/fellow. I reviewed the resident/fellow's documentation and discussed the patient with the resident/fellow. I agree with the resident/fellow's medical decision making as documented in the note.    MD Cee Gold MD  05/17/25 6703

## 2025-05-17 NOTE — ED TRIAGE NOTES
"Pt enters ED reporting 4/10 non-radiating, mid-sternal chest pain that began 1 day prior. Describes sensation as \"pressure\" but calls it \"faint heart.\" Denies n/v/d, dizziness, falls or injuries. Baseline hx: Schizoaffective disorder, bipolar type, depression, gluacoma, DM, HTN, and PE.  "

## 2025-05-19 ENCOUNTER — CLINICAL SUPPORT (OUTPATIENT)
Dept: EMERGENCY MEDICINE | Facility: HOSPITAL | Age: 60
End: 2025-05-19
Payer: MEDICARE

## 2025-05-19 ENCOUNTER — HOSPITAL ENCOUNTER (EMERGENCY)
Facility: HOSPITAL | Age: 60
Discharge: HOME | End: 2025-05-19
Attending: EMERGENCY MEDICINE
Payer: MEDICARE

## 2025-05-19 ENCOUNTER — APPOINTMENT (OUTPATIENT)
Dept: RADIOLOGY | Facility: HOSPITAL | Age: 60
End: 2025-05-19
Payer: MEDICARE

## 2025-05-19 VITALS
BODY MASS INDEX: 26.98 KG/M2 | HEIGHT: 68 IN | DIASTOLIC BLOOD PRESSURE: 76 MMHG | TEMPERATURE: 97.2 F | OXYGEN SATURATION: 100 % | SYSTOLIC BLOOD PRESSURE: 133 MMHG | RESPIRATION RATE: 16 BRPM | WEIGHT: 178 LBS | HEART RATE: 58 BPM

## 2025-05-19 DIAGNOSIS — R07.9 CHEST PAIN, UNSPECIFIED TYPE: ICD-10-CM

## 2025-05-19 DIAGNOSIS — G44.89 OTHER HEADACHE SYNDROME: Primary | ICD-10-CM

## 2025-05-19 LAB
ALBUMIN SERPL BCP-MCNC: 4 G/DL (ref 3.4–5)
ALP SERPL-CCNC: 58 U/L (ref 33–136)
ALT SERPL W P-5'-P-CCNC: 14 U/L (ref 10–52)
ANION GAP BLDV CALCULATED.4IONS-SCNC: 3 MMOL/L (ref 10–25)
ANION GAP SERPL CALC-SCNC: 12 MMOL/L (ref 10–20)
APAP SERPL-MCNC: <10 UG/ML (ref ?–30)
AST SERPL W P-5'-P-CCNC: 20 U/L (ref 9–39)
BASE EXCESS BLDV CALC-SCNC: 9.7 MMOL/L (ref -2–3)
BASOPHILS # BLD AUTO: 0.03 X10*3/UL (ref 0–0.1)
BASOPHILS NFR BLD AUTO: 0.5 %
BILIRUB SERPL-MCNC: 0.4 MG/DL (ref 0–1.2)
BODY TEMPERATURE: 37 DEGREES CELSIUS
BUN SERPL-MCNC: 18 MG/DL (ref 6–23)
CA-I BLDV-SCNC: 1.28 MMOL/L (ref 1.1–1.33)
CALCIUM SERPL-MCNC: 9.4 MG/DL (ref 8.6–10.6)
CARDIAC TROPONIN I PNL SERPL HS: 61 NG/L (ref 0–53)
CARDIAC TROPONIN I PNL SERPL HS: 67 NG/L (ref 0–53)
CHLORIDE BLDV-SCNC: 103 MMOL/L (ref 98–107)
CHLORIDE SERPL-SCNC: 101 MMOL/L (ref 98–107)
CO2 SERPL-SCNC: 32 MMOL/L (ref 21–32)
COHGB MFR BLDV: 0.1 %
CREAT SERPL-MCNC: 1.11 MG/DL (ref 0.5–1.3)
EGFRCR SERPLBLD CKD-EPI 2021: 76 ML/MIN/1.73M*2
EOSINOPHIL # BLD AUTO: 0.05 X10*3/UL (ref 0–0.7)
EOSINOPHIL NFR BLD AUTO: 0.9 %
ERYTHROCYTE [DISTWIDTH] IN BLOOD BY AUTOMATED COUNT: 12.9 % (ref 11.5–14.5)
ETHANOL SERPL-MCNC: <10 MG/DL
GLUCOSE BLDV-MCNC: 87 MG/DL (ref 74–99)
GLUCOSE SERPL-MCNC: 82 MG/DL (ref 74–99)
HCO3 BLDV-SCNC: 35.4 MMOL/L (ref 22–26)
HCT VFR BLD AUTO: 39.4 % (ref 41–52)
HCT VFR BLD EST: 41 % (ref 41–52)
HGB BLD-MCNC: 13 G/DL (ref 13.5–17.5)
HGB BLDV-MCNC: 13.7 G/DL (ref 13.5–17.5)
IMM GRANULOCYTES # BLD AUTO: 0.01 X10*3/UL (ref 0–0.7)
IMM GRANULOCYTES NFR BLD AUTO: 0.2 % (ref 0–0.9)
INHALED O2 CONCENTRATION: 21 %
LACTATE BLDV-SCNC: 1.2 MMOL/L (ref 0.4–2)
LYMPHOCYTES # BLD AUTO: 2.7 X10*3/UL (ref 1.2–4.8)
LYMPHOCYTES NFR BLD AUTO: 46.4 %
MAGNESIUM SERPL-MCNC: 1.81 MG/DL (ref 1.6–2.4)
MCH RBC QN AUTO: 29.9 PG (ref 26–34)
MCHC RBC AUTO-ENTMCNC: 33 G/DL (ref 32–36)
MCV RBC AUTO: 91 FL (ref 80–100)
METHGB MFR BLDV: 0.7 % (ref 0–1.5)
MONOCYTES # BLD AUTO: 0.58 X10*3/UL (ref 0.1–1)
MONOCYTES NFR BLD AUTO: 10 %
NEUTROPHILS # BLD AUTO: 2.45 X10*3/UL (ref 1.2–7.7)
NEUTROPHILS NFR BLD AUTO: 42 %
NRBC BLD-RTO: 0 /100 WBCS (ref 0–0)
OXYHGB MFR BLDV: 32.1 % (ref 45–75)
PCO2 BLDV: 51 MM HG (ref 41–51)
PH BLDV: 7.45 PH (ref 7.33–7.43)
PLATELET # BLD AUTO: 141 X10*3/UL (ref 150–450)
PO2 BLDV: 25 MM HG (ref 35–45)
POTASSIUM BLDV-SCNC: 3.8 MMOL/L (ref 3.5–5.3)
POTASSIUM SERPL-SCNC: 3.6 MMOL/L (ref 3.5–5.3)
PROT SERPL-MCNC: 6.9 G/DL (ref 6.4–8.2)
RBC # BLD AUTO: 4.35 X10*6/UL (ref 4.5–5.9)
SALICYLATES SERPL-MCNC: <3 MG/DL (ref ?–20)
SAO2 % BLDV: 32 % (ref 45–75)
SODIUM BLDV-SCNC: 138 MMOL/L (ref 136–145)
SODIUM SERPL-SCNC: 141 MMOL/L (ref 136–145)
WBC # BLD AUTO: 5.8 X10*3/UL (ref 4.4–11.3)

## 2025-05-19 PROCEDURE — 99285 EMERGENCY DEPT VISIT HI MDM: CPT | Mod: 25 | Performed by: EMERGENCY MEDICINE

## 2025-05-19 PROCEDURE — 2500000001 HC RX 250 WO HCPCS SELF ADMINISTERED DRUGS (ALT 637 FOR MEDICARE OP)

## 2025-05-19 PROCEDURE — 83735 ASSAY OF MAGNESIUM: CPT

## 2025-05-19 PROCEDURE — 2550000001 HC RX 255 CONTRASTS: Performed by: EMERGENCY MEDICINE

## 2025-05-19 PROCEDURE — 36415 COLL VENOUS BLD VENIPUNCTURE: CPT

## 2025-05-19 PROCEDURE — 70498 CT ANGIOGRAPHY NECK: CPT | Performed by: STUDENT IN AN ORGANIZED HEALTH CARE EDUCATION/TRAINING PROGRAM

## 2025-05-19 PROCEDURE — 85025 COMPLETE CBC W/AUTO DIFF WBC: CPT

## 2025-05-19 PROCEDURE — 82375 ASSAY CARBOXYHB QUANT: CPT

## 2025-05-19 PROCEDURE — 84132 ASSAY OF SERUM POTASSIUM: CPT | Mod: 59

## 2025-05-19 PROCEDURE — 93005 ELECTROCARDIOGRAM TRACING: CPT

## 2025-05-19 PROCEDURE — 70496 CT ANGIOGRAPHY HEAD: CPT | Performed by: STUDENT IN AN ORGANIZED HEALTH CARE EDUCATION/TRAINING PROGRAM

## 2025-05-19 PROCEDURE — 84132 ASSAY OF SERUM POTASSIUM: CPT

## 2025-05-19 PROCEDURE — 2500000002 HC RX 250 W HCPCS SELF ADMINISTERED DRUGS (ALT 637 FOR MEDICARE OP, ALT 636 FOR OP/ED)

## 2025-05-19 PROCEDURE — 80320 DRUG SCREEN QUANTALCOHOLS: CPT | Performed by: EMERGENCY MEDICINE

## 2025-05-19 PROCEDURE — 84484 ASSAY OF TROPONIN QUANT: CPT

## 2025-05-19 PROCEDURE — 70498 CT ANGIOGRAPHY NECK: CPT

## 2025-05-19 RX ORDER — DIVALPROEX SODIUM 250 MG/1
1500 TABLET, FILM COATED, EXTENDED RELEASE ORAL ONCE
Status: COMPLETED | OUTPATIENT
Start: 2025-05-19 | End: 2025-05-19

## 2025-05-19 RX ORDER — BENZTROPINE MESYLATE 1 MG/1
0.5 TABLET ORAL ONCE
Status: COMPLETED | OUTPATIENT
Start: 2025-05-19 | End: 2025-05-19

## 2025-05-19 RX ORDER — TAMSULOSIN HYDROCHLORIDE 0.4 MG/1
0.4 CAPSULE ORAL ONCE
Status: COMPLETED | OUTPATIENT
Start: 2025-05-19 | End: 2025-05-19

## 2025-05-19 RX ORDER — TRAZODONE HYDROCHLORIDE 50 MG/1
100 TABLET ORAL NIGHTLY
Status: DISCONTINUED | OUTPATIENT
Start: 2025-05-19 | End: 2025-05-19 | Stop reason: HOSPADM

## 2025-05-19 RX ADMIN — TRAZODONE HYDROCHLORIDE 100 MG: 50 TABLET ORAL at 21:29

## 2025-05-19 RX ADMIN — BENZTROPINE MESYLATE 0.5 MG: 1 TABLET ORAL at 21:29

## 2025-05-19 RX ADMIN — TAMSULOSIN HYDROCHLORIDE 0.4 MG: 0.4 CAPSULE ORAL at 21:29

## 2025-05-19 RX ADMIN — IOHEXOL 85 ML: 350 INJECTION, SOLUTION INTRAVENOUS at 19:36

## 2025-05-19 RX ADMIN — DIVALPROEX SODIUM 1500 MG: 250 TABLET, EXTENDED RELEASE ORAL at 21:29

## 2025-05-19 ASSESSMENT — LIFESTYLE VARIABLES
HAVE YOU EVER FELT YOU SHOULD CUT DOWN ON YOUR DRINKING: NO
EVER HAD A DRINK FIRST THING IN THE MORNING TO STEADY YOUR NERVES TO GET RID OF A HANGOVER: NO
TOTAL SCORE: 0
EVER FELT BAD OR GUILTY ABOUT YOUR DRINKING: NO
HAVE PEOPLE ANNOYED YOU BY CRITICIZING YOUR DRINKING: NO

## 2025-05-19 ASSESSMENT — PAIN - FUNCTIONAL ASSESSMENT: PAIN_FUNCTIONAL_ASSESSMENT: 0-10

## 2025-05-19 ASSESSMENT — PAIN SCALES - GENERAL: PAINLEVEL_OUTOF10: 0 - NO PAIN
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
## 2025-05-20 LAB
ATRIAL RATE: 46 BPM
P AXIS: 52 DEGREES
P OFFSET: 201 MS
P ONSET: 145 MS
PR INTERVAL: 162 MS
Q ONSET: 226 MS
QRS COUNT: 8 BEATS
QRS DURATION: 106 MS
QT INTERVAL: 446 MS
QTC CALCULATION(BAZETT): 390 MS
QTC FREDERICIA: 408 MS
R AXIS: 8 DEGREES
T AXIS: -54 DEGREES
T OFFSET: 449 MS
VENTRICULAR RATE: 46 BPM

## 2025-05-21 LAB
ATRIAL RATE: 78 BPM
P AXIS: 66 DEGREES
P OFFSET: 204 MS
P ONSET: 147 MS
PR INTERVAL: 160 MS
Q ONSET: 227 MS
QRS COUNT: 13 BEATS
QRS DURATION: 104 MS
QT INTERVAL: 368 MS
QTC CALCULATION(BAZETT): 419 MS
QTC FREDERICIA: 401 MS
R AXIS: -24 DEGREES
T AXIS: 8 DEGREES
T OFFSET: 411 MS
VENTRICULAR RATE: 78 BPM

## 2025-05-27 ENCOUNTER — APPOINTMENT (OUTPATIENT)
Dept: CARDIOLOGY | Facility: CLINIC | Age: 60
End: 2025-05-27
Payer: MEDICARE

## 2025-07-02 ENCOUNTER — APPOINTMENT (OUTPATIENT)
Dept: OPHTHALMOLOGY | Facility: CLINIC | Age: 60
End: 2025-07-02
Payer: MEDICARE

## 2025-08-09 ENCOUNTER — APPOINTMENT (OUTPATIENT)
Dept: RADIOLOGY | Facility: HOSPITAL | Age: 60
End: 2025-08-09
Payer: COMMERCIAL

## 2025-08-09 ENCOUNTER — HOSPITAL ENCOUNTER (EMERGENCY)
Facility: HOSPITAL | Age: 60
Discharge: HOME | End: 2025-08-09
Attending: EMERGENCY MEDICINE
Payer: COMMERCIAL

## 2025-08-09 VITALS
RESPIRATION RATE: 17 BRPM | DIASTOLIC BLOOD PRESSURE: 77 MMHG | SYSTOLIC BLOOD PRESSURE: 127 MMHG | BODY MASS INDEX: 27.28 KG/M2 | OXYGEN SATURATION: 98 % | HEIGHT: 68 IN | WEIGHT: 180 LBS | HEART RATE: 54 BPM | TEMPERATURE: 97.3 F

## 2025-08-09 DIAGNOSIS — N50.811 PAIN IN RIGHT TESTICLE: ICD-10-CM

## 2025-08-09 DIAGNOSIS — R10.30 LOWER ABDOMINAL PAIN: Primary | ICD-10-CM

## 2025-08-09 LAB
ALBUMIN SERPL BCP-MCNC: 3.8 G/DL (ref 3.4–5)
ALP SERPL-CCNC: 54 U/L (ref 33–136)
ALT SERPL W P-5'-P-CCNC: 23 U/L (ref 10–52)
ANION GAP SERPL CALC-SCNC: 10 MMOL/L (ref 10–20)
APPEARANCE UR: CLEAR
AST SERPL W P-5'-P-CCNC: 42 U/L (ref 9–39)
BASOPHILS # BLD AUTO: 0.03 X10*3/UL (ref 0–0.1)
BASOPHILS NFR BLD AUTO: 0.3 %
BILIRUB SERPL-MCNC: 0.6 MG/DL (ref 0–1.2)
BILIRUB UR STRIP.AUTO-MCNC: NEGATIVE MG/DL
BUN SERPL-MCNC: 11 MG/DL (ref 6–23)
CALCIUM SERPL-MCNC: 9.3 MG/DL (ref 8.6–10.6)
CHLORIDE SERPL-SCNC: 102 MMOL/L (ref 98–107)
CO2 SERPL-SCNC: 33 MMOL/L (ref 21–32)
COLOR UR: YELLOW
CREAT SERPL-MCNC: 1.2 MG/DL (ref 0.5–1.3)
EGFRCR SERPLBLD CKD-EPI 2021: 69 ML/MIN/1.73M*2
EOSINOPHIL # BLD AUTO: 0.05 X10*3/UL (ref 0–0.7)
EOSINOPHIL NFR BLD AUTO: 0.5 %
ERYTHROCYTE [DISTWIDTH] IN BLOOD BY AUTOMATED COUNT: 14.1 % (ref 11.5–14.5)
GLUCOSE SERPL-MCNC: 83 MG/DL (ref 74–99)
GLUCOSE UR STRIP.AUTO-MCNC: NORMAL MG/DL
HCT VFR BLD AUTO: 38.3 % (ref 41–52)
HGB BLD-MCNC: 13 G/DL (ref 13.5–17.5)
IMM GRANULOCYTES # BLD AUTO: 0.04 X10*3/UL (ref 0–0.7)
IMM GRANULOCYTES NFR BLD AUTO: 0.4 % (ref 0–0.9)
KETONES UR STRIP.AUTO-MCNC: ABNORMAL MG/DL
LEUKOCYTE ESTERASE UR QL STRIP.AUTO: NEGATIVE
LIPASE SERPL-CCNC: 31 U/L (ref 9–82)
LYMPHOCYTES # BLD AUTO: 1.77 X10*3/UL (ref 1.2–4.8)
LYMPHOCYTES NFR BLD AUTO: 18.3 %
MCH RBC QN AUTO: 30.9 PG (ref 26–34)
MCHC RBC AUTO-ENTMCNC: 33.9 G/DL (ref 32–36)
MCV RBC AUTO: 91 FL (ref 80–100)
MONOCYTES # BLD AUTO: 1.09 X10*3/UL (ref 0.1–1)
MONOCYTES NFR BLD AUTO: 11.2 %
MUCOUS THREADS #/AREA URNS AUTO: NORMAL /LPF
NEUTROPHILS # BLD AUTO: 6.71 X10*3/UL (ref 1.2–7.7)
NEUTROPHILS NFR BLD AUTO: 69.3 %
NITRITE UR QL STRIP.AUTO: NEGATIVE
NRBC BLD-RTO: 0 /100 WBCS (ref 0–0)
PH UR STRIP.AUTO: 8.5 [PH]
PLATELET # BLD AUTO: 126 X10*3/UL (ref 150–450)
POTASSIUM SERPL-SCNC: 4.5 MMOL/L (ref 3.5–5.3)
PROT SERPL-MCNC: 7.1 G/DL (ref 6.4–8.2)
PROT UR STRIP.AUTO-MCNC: ABNORMAL MG/DL
RBC # BLD AUTO: 4.21 X10*6/UL (ref 4.5–5.9)
RBC # UR STRIP.AUTO: NEGATIVE MG/DL
RBC #/AREA URNS AUTO: NORMAL /HPF
SODIUM SERPL-SCNC: 140 MMOL/L (ref 136–145)
SP GR UR STRIP.AUTO: 1.02
UROBILINOGEN UR STRIP.AUTO-MCNC: ABNORMAL MG/DL
WBC # BLD AUTO: 9.7 X10*3/UL (ref 4.4–11.3)
WBC #/AREA URNS AUTO: NORMAL /HPF

## 2025-08-09 PROCEDURE — 2500000001 HC RX 250 WO HCPCS SELF ADMINISTERED DRUGS (ALT 637 FOR MEDICARE OP): Performed by: PHYSICIAN ASSISTANT

## 2025-08-09 PROCEDURE — 80053 COMPREHEN METABOLIC PANEL: CPT | Performed by: PHYSICIAN ASSISTANT

## 2025-08-09 PROCEDURE — 76870 US EXAM SCROTUM: CPT | Performed by: STUDENT IN AN ORGANIZED HEALTH CARE EDUCATION/TRAINING PROGRAM

## 2025-08-09 PROCEDURE — 93975 VASCULAR STUDY: CPT

## 2025-08-09 PROCEDURE — 99284 EMERGENCY DEPT VISIT MOD MDM: CPT | Performed by: PHYSICIAN ASSISTANT

## 2025-08-09 PROCEDURE — 81001 URINALYSIS AUTO W/SCOPE: CPT | Performed by: PHYSICIAN ASSISTANT

## 2025-08-09 PROCEDURE — 85025 COMPLETE CBC W/AUTO DIFF WBC: CPT | Performed by: PHYSICIAN ASSISTANT

## 2025-08-09 PROCEDURE — 99284 EMERGENCY DEPT VISIT MOD MDM: CPT | Mod: 25 | Performed by: EMERGENCY MEDICINE

## 2025-08-09 PROCEDURE — 83690 ASSAY OF LIPASE: CPT | Performed by: PHYSICIAN ASSISTANT

## 2025-08-09 PROCEDURE — 93976 VASCULAR STUDY: CPT | Performed by: STUDENT IN AN ORGANIZED HEALTH CARE EDUCATION/TRAINING PROGRAM

## 2025-08-09 PROCEDURE — 36415 COLL VENOUS BLD VENIPUNCTURE: CPT | Performed by: PHYSICIAN ASSISTANT

## 2025-08-09 RX ORDER — LEVOFLOXACIN 500 MG/1
500 TABLET, FILM COATED ORAL DAILY
Qty: 10 TABLET | Refills: 0 | Status: SHIPPED | OUTPATIENT
Start: 2025-08-09 | End: 2025-08-19

## 2025-08-09 RX ORDER — ACETAMINOPHEN 325 MG/1
975 TABLET ORAL ONCE
Status: COMPLETED | OUTPATIENT
Start: 2025-08-09 | End: 2025-08-09

## 2025-08-09 RX ORDER — LEVOFLOXACIN 500 MG/1
500 TABLET, FILM COATED ORAL ONCE
Status: COMPLETED | OUTPATIENT
Start: 2025-08-09 | End: 2025-08-09

## 2025-08-09 RX ADMIN — ACETAMINOPHEN 975 MG: 325 TABLET, FILM COATED ORAL at 09:19

## 2025-08-09 RX ADMIN — LEVOFLOXACIN 500 MG: 500 TABLET, FILM COATED ORAL at 13:14

## 2025-08-09 ASSESSMENT — PAIN DESCRIPTION - PAIN TYPE: TYPE: ACUTE PAIN

## 2025-08-09 ASSESSMENT — PAIN SCALES - GENERAL: PAINLEVEL_OUTOF10: 7

## 2025-08-09 ASSESSMENT — PAIN - FUNCTIONAL ASSESSMENT: PAIN_FUNCTIONAL_ASSESSMENT: 0-10

## 2025-08-09 ASSESSMENT — PAIN DESCRIPTION - LOCATION: LOCATION: ABDOMEN

## 2025-08-09 NOTE — DISCHARGE INSTRUCTIONS
You are being given an antibiotic for your testicular pain.  Please take the full course to completion.  You should follow-up with your primary care provider within 1 week.    Please return to the emergency room if you begin experiencing new or worsening symptoms.

## 2025-08-09 NOTE — ED TRIAGE NOTES
Patient to ED with c/o lower abd pain x last night. Per patient patient states pain is from masturbating for too long. Denies any N/V, diarrhea or troubles urinating.

## 2025-08-09 NOTE — ED PROVIDER NOTES
"    History of Present Illness       Limitations to history: None  Independent Historian: patient  External Records Reviewed: EMR, outside records, Care-everywhere    HPI:  Patient is 59-year-old male with a schizoaffective disorder and pyrone's disease presenting to the ED with lower abdominal pain and right testicular pain since last night.  Patient states he thinks it is from \"masturbating too long.\" Patient states after he masturbated, he noticed right testicular pain that radiates up to his right suprapubic region.  Patient denies nausea, vomiting, diarrhea, constipation, and loss of flatulence.  Patient denies urinary changes of dysuria, urinary frequency/urgency, hematuria, flank pain, or fevers.  Patient denies penile discharge, pain, rashes or lesions.  Patient denies fevers or chills. Patient denies any recent sexual activity with a partner.  No concerns for STI.  Patient denies history of kidney stones or hernias.       History provided by:  Patient   used: No       Physical Exam   Physical Exam  Vitals and nursing note reviewed.   Constitutional:       Comments: Patient alert and oriented, nontoxic or ill-appearing, blunt affect, sitting in the chair in no acute distress.  Afebrile VSS.   HENT:      Head: Normocephalic and atraumatic.      Mouth/Throat:      Mouth: Mucous membranes are moist.     Eyes:      Extraocular Movements: Extraocular movements intact.      Pupils: Pupils are equal, round, and reactive to light.       Cardiovascular:      Rate and Rhythm: Normal rate and regular rhythm.      Heart sounds: Normal heart sounds. No murmur heard.     No friction rub. No gallop.   Pulmonary:      Effort: Pulmonary effort is normal.      Breath sounds: Normal breath sounds. No wheezing, rhonchi or rales.   Abdominal:      Comments: Abdomen flat, soft. Normoactive bowel sounds. Minimal suprapubic tenderness. No rigidity, guarding, or rebound tenderness. No hernias appreciated. No " overlying skin changes. No CVA tenderness bilaterally. Negative Wood's, Nicole's, Rovsing's, obturators, and psoas.     Genitourinary:     Penis: Normal.       Comments: External genitalia without rashes or lesions.  Penis flaccid, non-tender without lesions.  No penile discharge.  Right testicle mild edema without overlying erythema.  Right testicular greatest along the epididymis.  Pain relief with elevation. No crepitus.  Left testicle nontender.  No testicular masses appreciated    Skin:     General: Skin is warm and dry.      Capillary Refill: Capillary refill takes less than 2 seconds.      Coloration: Skin is not pale.      Findings: No erythema or rash.     Neurological:      General: No focal deficit present.      Mental Status: He is oriented to person, place, and time.     Psychiatric:         Mood and Affect: Mood normal.         Behavior: Behavior normal.        Triage vitals:  T 36.3 °C (97.3 °F)  HR 54  /77  RR 17  O2 98 % None (Room air)    Medical Decision Making & ED Course     ED Course & [facility]     Medical Decision Making  Patient is 59-year-old male with a schizoaffective disorder history of pyrone's disease presenting to the ED with abdominal pain and right testicular pain x 1 day. Patient alert and oriented, nontoxic or ill-appearing, blunt affect, sitting in the chair in no acute distress.  Afebrile, VSS. No systemic symptoms. No bowel complaints. No urinary or penile complaints. Not sexually active. On exam, mild tenderness along right epididymitis without overlying skin changes.  Bedbugs found on patient during exam. Patient given tylenol for his symptoms.    Low concern for testicular torsion, however, given sudden onset and moderate pain, testicular ultrasound with Doppler obtained and found to be negative for testicular torsion. Low concern for testicular torsion at this time. However, testicular ultrasound showed slightly increased hypervascularity of the right testicle,  "\"overall nonspecific however early epididymo-orchitis cannot be excluded.\" Of note, testicular ultrasound showed small bilateral hydroceles.  On reassessment, patient endorsing improvement in his symptoms.  Low concern foreigners gangrene as patient is not immunocompromised or diabetic, no pain out of proportion on exam and improving symptoms after medications.    Patient at the time of discharge, urine analysis pending.  Patient did not want to wait for the rest of the results.  Patient suspected to have epididymitis and prescribed levofloxacin for 10 days.  Patient given a prescription sent to pharmacy.  Given strict return precautions.  Patient should follow-up with primary care provider. Plan of care was discussed and patient was agreeable to the plan. All questions were answered.     Patient was discussed and examined by Dr. Dolores Edwards MD    Amount and/or Complexity of Data Reviewed  External Data Reviewed: labs, radiology and notes.  Labs: ordered. Decision-making details documented in ED Course.  Radiology: ordered. Decision-making details documented in ED Course.       ----    Visit Vitals  /77 (BP Location: Left arm, Patient Position: Sitting)   Pulse 54   Temp 36.3 °C (97.3 °F) (Temporal)   Resp 17   Ht 1.727 m (5' 8\")   Wt 81.6 kg (180 lb)   SpO2 98%   BMI 27.37 kg/m²   Smoking Status Never   BSA 1.98 m²        Labs Reviewed   CBC WITH AUTO DIFFERENTIAL - Abnormal       Result Value    WBC 9.7      nRBC 0.0      RBC 4.21 (*)     Hemoglobin 13.0 (*)     Hematocrit 38.3 (*)     MCV 91      MCH 30.9      MCHC 33.9      RDW 14.1      Platelets 126 (*)     Neutrophils % 69.3      Immature Granulocytes %, Automated 0.4      Lymphocytes % 18.3      Monocytes % 11.2      Eosinophils % 0.5      Basophils % 0.3      Neutrophils Absolute 6.71      Immature Granulocytes Absolute, Automated 0.04      Lymphocytes Absolute 1.77      Monocytes Absolute 1.09 (*)     Eosinophils Absolute 0.05      Basophils " Absolute 0.03     COMPREHENSIVE METABOLIC PANEL - Abnormal    Glucose 83      Sodium 140      Potassium 4.5      Chloride 102      Bicarbonate 33 (*)     Anion Gap 10      Urea Nitrogen 11      Creatinine 1.20      eGFR 69      Calcium 9.3      Albumin 3.8      Alkaline Phosphatase 54      Total Protein 7.1      AST 42 (*)     Bilirubin, Total 0.6      ALT 23     LIPASE - Normal    Lipase 31      Narrative:     Venipuncture immediately after or during the administration of Metamizole may lead to falsely low results. Testing should be performed immediately prior to Metamizole dosing.   URINALYSIS WITH REFLEX CULTURE AND MICROSCOPIC    Narrative:     The following orders were created for panel order Urinalysis with Reflex Culture and Microscopic.  Procedure                               Abnormality         Status                     ---------                               -----------         ------                     Urinalysis with Reflex C...[[id number]]                                                 Extra Urine Gray Tube[[id number]]                                                         Please view results for these tests on the individual orders.   URINALYSIS WITH REFLEX CULTURE AND MICROSCOPIC   EXTRA URINE GRAY TUBE       US scrotum w doppler    (Results Pending)       ED Course:  ED Course as of 08/10/25 1333   Sun Aug 10, 2025   0743 Attending MDM:    Patient is a 59-year-old male here for right testicular pain that started last night.  Scrotal ultrasound obtained demonstrating possible early epididymitis and orchitis.  The patient will be empirically treated with levofloxacin as he denies any recent sexual activity.  Patient discharged home with outpatient follow-up.    Shayna Edwards MD   [BR]      ED Course User Index  [BR] Shayna Edwards MD         Diagnoses as of 08/10/25 1333   Lower abdominal pain   Pain in right testicle     Disposition     As result of the workup, the patient was discharged  home.  Patient was informed of their diagnosis and instructed to come back with any concerns or worsening of condition, agreeable to the plan above.  Patient given the opportunity ask questions, all of the patient's questions were answered. Patient was stable at the time of discharge.    Procedures   Procedures    COMMENT: Please note this report has been produced using speech recognition software and may contain errors related that system including errors in grammar, punctuation, spelling as well as words and phrases that may be inappropriate.  If there are any questions or concerns please feel free to contact the dictating provider for clarification.     Yelitza Pérez PA-C  Emergency Medicine      Yelitza Pérez PA-C  08/10/25 9817

## 2025-08-11 LAB — HOLD SPECIMEN: NORMAL

## 2025-09-24 ENCOUNTER — APPOINTMENT (OUTPATIENT)
Dept: OPHTHALMOLOGY | Facility: CLINIC | Age: 60
End: 2025-09-24
Payer: MEDICARE